# Patient Record
Sex: FEMALE | Race: BLACK OR AFRICAN AMERICAN | URBAN - METROPOLITAN AREA
[De-identification: names, ages, dates, MRNs, and addresses within clinical notes are randomized per-mention and may not be internally consistent; named-entity substitution may affect disease eponyms.]

---
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
## 2022-02-10 ENCOUNTER — OUTPATIENT (OUTPATIENT)
Dept: OUTPATIENT SERVICES | Facility: HOSPITAL | Age: 22
LOS: 1 days | Discharge: HOME | End: 2022-02-10

## 2022-02-10 ENCOUNTER — APPOINTMENT (OUTPATIENT)
Dept: PSYCHIATRY | Facility: CLINIC | Age: 22
End: 2022-02-10

## 2022-02-10 DIAGNOSIS — F31.31 BIPOLAR DISORDER, CURRENT EPISODE DEPRESSED, MILD: ICD-10-CM

## 2022-02-10 PROBLEM — Z00.00 ENCOUNTER FOR PREVENTIVE HEALTH EXAMINATION: Status: ACTIVE | Noted: 2022-02-10

## 2022-03-09 ENCOUNTER — APPOINTMENT (OUTPATIENT)
Dept: PSYCHIATRY | Facility: CLINIC | Age: 22
End: 2022-03-09

## 2024-06-19 ENCOUNTER — EMERGENCY (EMERGENCY)
Facility: HOSPITAL | Age: 24
LOS: 0 days | Discharge: HOME | End: 2024-06-19
Attending: EMERGENCY MEDICINE
Payer: COMMERCIAL

## 2024-06-19 VITALS
HEART RATE: 90 BPM | HEIGHT: 67 IN | OXYGEN SATURATION: 98 % | TEMPERATURE: 99 F | RESPIRATION RATE: 16 BRPM | DIASTOLIC BLOOD PRESSURE: 68 MMHG | WEIGHT: 205.03 LBS | SYSTOLIC BLOOD PRESSURE: 114 MMHG

## 2024-06-19 DIAGNOSIS — R10.9 UNSPECIFIED ABDOMINAL PAIN: ICD-10-CM

## 2024-06-19 DIAGNOSIS — O03.6 DELAYED OR EXCESSIVE HEMORRHAGE FOLLOWING COMPLETE OR UNSPECIFIED SPONTANEOUS ABORTION: ICD-10-CM

## 2024-06-19 DIAGNOSIS — R10.2 PELVIC AND PERINEAL PAIN: ICD-10-CM

## 2024-06-19 DIAGNOSIS — O20.9 HEMORRHAGE IN EARLY PREGNANCY, UNSPECIFIED: ICD-10-CM

## 2024-06-19 DIAGNOSIS — O99.891 OTHER SPECIFIED DISEASES AND CONDITIONS COMPLICATING PREGNANCY: ICD-10-CM

## 2024-06-19 DIAGNOSIS — R35.0 FREQUENCY OF MICTURITION: ICD-10-CM

## 2024-06-19 DIAGNOSIS — R11.0 NAUSEA: ICD-10-CM

## 2024-06-19 DIAGNOSIS — Z88.0 ALLERGY STATUS TO PENICILLIN: ICD-10-CM

## 2024-06-19 LAB
ABO RH CONFIRMATION: SIGNIFICANT CHANGE UP
ALBUMIN SERPL ELPH-MCNC: 4.1 G/DL — SIGNIFICANT CHANGE UP (ref 3.5–5.2)
ALP SERPL-CCNC: 117 U/L — HIGH (ref 30–115)
ALT FLD-CCNC: 13 U/L — SIGNIFICANT CHANGE UP (ref 0–41)
ANION GAP SERPL CALC-SCNC: 10 MMOL/L — SIGNIFICANT CHANGE UP (ref 7–14)
APPEARANCE UR: CLEAR — SIGNIFICANT CHANGE UP
AST SERPL-CCNC: 19 U/L — SIGNIFICANT CHANGE UP (ref 0–41)
BASOPHILS # BLD AUTO: 0.02 K/UL — SIGNIFICANT CHANGE UP (ref 0–0.2)
BASOPHILS NFR BLD AUTO: 0.4 % — SIGNIFICANT CHANGE UP (ref 0–1)
BILIRUB SERPL-MCNC: 0.3 MG/DL — SIGNIFICANT CHANGE UP (ref 0.2–1.2)
BILIRUB UR-MCNC: NEGATIVE — SIGNIFICANT CHANGE UP
BUN SERPL-MCNC: 8 MG/DL — LOW (ref 10–20)
CALCIUM SERPL-MCNC: 8.8 MG/DL — SIGNIFICANT CHANGE UP (ref 8.4–10.4)
CHLORIDE SERPL-SCNC: 103 MMOL/L — SIGNIFICANT CHANGE UP (ref 98–110)
CO2 SERPL-SCNC: 23 MMOL/L — SIGNIFICANT CHANGE UP (ref 17–32)
COLOR SPEC: YELLOW — SIGNIFICANT CHANGE UP
CREAT SERPL-MCNC: 1.1 MG/DL — SIGNIFICANT CHANGE UP (ref 0.7–1.5)
DIFF PNL FLD: NEGATIVE — SIGNIFICANT CHANGE UP
EGFR: 72 ML/MIN/1.73M2 — SIGNIFICANT CHANGE UP
EOSINOPHIL # BLD AUTO: 0.15 K/UL — SIGNIFICANT CHANGE UP (ref 0–0.7)
EOSINOPHIL NFR BLD AUTO: 2.7 % — SIGNIFICANT CHANGE UP (ref 0–8)
GLUCOSE SERPL-MCNC: 81 MG/DL — SIGNIFICANT CHANGE UP (ref 70–99)
GLUCOSE UR QL: NEGATIVE MG/DL — SIGNIFICANT CHANGE UP
HCG SERPL-ACNC: 193.1 MIU/ML — HIGH
HCT VFR BLD CALC: 37.5 % — SIGNIFICANT CHANGE UP (ref 37–47)
HGB BLD-MCNC: 12 G/DL — SIGNIFICANT CHANGE UP (ref 12–16)
IMM GRANULOCYTES NFR BLD AUTO: 0.2 % — SIGNIFICANT CHANGE UP (ref 0.1–0.3)
KETONES UR-MCNC: NEGATIVE MG/DL — SIGNIFICANT CHANGE UP
LEUKOCYTE ESTERASE UR-ACNC: NEGATIVE — SIGNIFICANT CHANGE UP
LYMPHOCYTES # BLD AUTO: 1.3 K/UL — SIGNIFICANT CHANGE UP (ref 1.2–3.4)
LYMPHOCYTES # BLD AUTO: 23.1 % — SIGNIFICANT CHANGE UP (ref 20.5–51.1)
MCHC RBC-ENTMCNC: 29.4 PG — SIGNIFICANT CHANGE UP (ref 27–31)
MCHC RBC-ENTMCNC: 32 G/DL — SIGNIFICANT CHANGE UP (ref 32–37)
MCV RBC AUTO: 91.9 FL — SIGNIFICANT CHANGE UP (ref 81–99)
MONOCYTES # BLD AUTO: 0.4 K/UL — SIGNIFICANT CHANGE UP (ref 0.1–0.6)
MONOCYTES NFR BLD AUTO: 7.1 % — SIGNIFICANT CHANGE UP (ref 1.7–9.3)
NEUTROPHILS # BLD AUTO: 3.75 K/UL — SIGNIFICANT CHANGE UP (ref 1.4–6.5)
NEUTROPHILS NFR BLD AUTO: 66.5 % — SIGNIFICANT CHANGE UP (ref 42.2–75.2)
NITRITE UR-MCNC: NEGATIVE — SIGNIFICANT CHANGE UP
NRBC # BLD: 0 /100 WBCS — SIGNIFICANT CHANGE UP (ref 0–0)
PH UR: 6.5 — SIGNIFICANT CHANGE UP (ref 5–8)
PLATELET # BLD AUTO: 198 K/UL — SIGNIFICANT CHANGE UP (ref 130–400)
PMV BLD: 12.1 FL — HIGH (ref 7.4–10.4)
POTASSIUM SERPL-MCNC: 4.6 MMOL/L — SIGNIFICANT CHANGE UP (ref 3.5–5)
POTASSIUM SERPL-SCNC: 4.6 MMOL/L — SIGNIFICANT CHANGE UP (ref 3.5–5)
PROT SERPL-MCNC: 6.6 G/DL — SIGNIFICANT CHANGE UP (ref 6–8)
PROT UR-MCNC: NEGATIVE MG/DL — SIGNIFICANT CHANGE UP
RBC # BLD: 4.08 M/UL — LOW (ref 4.2–5.4)
RBC # FLD: 13.6 % — SIGNIFICANT CHANGE UP (ref 11.5–14.5)
SODIUM SERPL-SCNC: 136 MMOL/L — SIGNIFICANT CHANGE UP (ref 135–146)
SP GR SPEC: 1.01 — SIGNIFICANT CHANGE UP (ref 1–1.03)
UROBILINOGEN FLD QL: 0.2 MG/DL — SIGNIFICANT CHANGE UP (ref 0.2–1)
WBC # BLD: 5.63 K/UL — SIGNIFICANT CHANGE UP (ref 4.8–10.8)
WBC # FLD AUTO: 5.63 K/UL — SIGNIFICANT CHANGE UP (ref 4.8–10.8)

## 2024-06-19 PROCEDURE — 99284 EMERGENCY DEPT VISIT MOD MDM: CPT

## 2024-06-19 PROCEDURE — 99284 EMERGENCY DEPT VISIT MOD MDM: CPT | Mod: 25

## 2024-06-19 PROCEDURE — 80053 COMPREHEN METABOLIC PANEL: CPT

## 2024-06-19 PROCEDURE — 96374 THER/PROPH/DIAG INJ IV PUSH: CPT

## 2024-06-19 PROCEDURE — 86900 BLOOD TYPING SEROLOGIC ABO: CPT

## 2024-06-19 PROCEDURE — 86901 BLOOD TYPING SEROLOGIC RH(D): CPT

## 2024-06-19 PROCEDURE — 85025 COMPLETE CBC W/AUTO DIFF WBC: CPT

## 2024-06-19 PROCEDURE — 36415 COLL VENOUS BLD VENIPUNCTURE: CPT

## 2024-06-19 PROCEDURE — 86850 RBC ANTIBODY SCREEN: CPT

## 2024-06-19 PROCEDURE — 76830 TRANSVAGINAL US NON-OB: CPT | Mod: 26

## 2024-06-19 PROCEDURE — 81003 URINALYSIS AUTO W/O SCOPE: CPT

## 2024-06-19 PROCEDURE — 84702 CHORIONIC GONADOTROPIN TEST: CPT

## 2024-06-19 PROCEDURE — 76830 TRANSVAGINAL US NON-OB: CPT

## 2024-06-19 RX ORDER — ACETAMINOPHEN 500 MG
650 TABLET ORAL ONCE
Refills: 0 | Status: COMPLETED | OUTPATIENT
Start: 2024-06-19 | End: 2024-06-19

## 2024-06-19 RX ORDER — KETOROLAC TROMETHAMINE 30 MG/ML
15 SYRINGE (ML) INJECTION ONCE
Refills: 0 | Status: DISCONTINUED | OUTPATIENT
Start: 2024-06-19 | End: 2024-06-19

## 2024-06-19 RX ADMIN — Medication 15 MILLIGRAM(S): at 21:16

## 2024-06-19 RX ADMIN — Medication 650 MILLIGRAM(S): at 20:04

## 2024-06-19 NOTE — ED PROVIDER NOTE - PROVIDER TOKENS
FREE:[LAST:[follow up with your OBGYN],PHONE:[(   )    -],FAX:[(   )    -],FOLLOWUP:[1-3 Days]],FREE:[LAST:[follow up with your primary doctor],PHONE:[(   )    -],FAX:[(   )    -],FOLLOWUP:[1-3 Days]]

## 2024-06-19 NOTE — ED PROVIDER NOTE - PATIENT PORTAL LINK FT
You can access the FollowMyHealth Patient Portal offered by [facility] by registering at the following website: [email or web address]. By joining [employer]s FollowMyHealth portal, you will also be able to view your health information using other applications (apps) compatible with our system.

## 2024-06-19 NOTE — ED PROVIDER NOTE - PHYSICAL EXAMINATION
CONST: Well appearing in NAD  EYES: EOMI, Sclera and conjunctiva clear.   CARD: Normal S1 S2; Normal rate and rhythm  RESP: Equal BS B/L, No wheezes, rhonchi or rales. No distress  GI: Soft, non-distended; TTP LLQ and L suprapubic area  MS: Normal ROM in all extremities. No midline spinal tenderness.  SKIN: Warm, dry, no acute rashes. Good turgor  NEURO: A&Ox3, No focal deficits. Strength 5/5 with no sensory deficits. Steady gait

## 2024-06-19 NOTE — ED PROVIDER NOTE - CARE PROVIDER_API CALL
follow up with your OBGYN,   Phone: (   )    -  Fax: (   )    -  Follow Up Time: 1-3 Days    follow up with your primary doctor,   Phone: (   )    -  Fax: (   )    -  Follow Up Time: 1-3 Days

## 2024-06-19 NOTE — ED PROVIDER NOTE - OBJECTIVE STATEMENT
patient is a 24yo  female no sig PMH coming to ED for vaginal bleeding and abdominal pain. pt reports +preg and had US at OBGYN  and was told she was 4w5d, had repeat US on  and gestational sac was noted, had f/u appt and US was negative with no gestational sac noted. pt bHCG not trending down as expected per OBGYN. has had heavy vaginal bleeding x1 week, using 5-6 pads. saw OBGYN today and was told to come to ED for further eval. has associated nausea and increased urinary frequency. denies fever, vomiting, diarrhea, burning with urination, rash, chest pain, SOB

## 2024-06-19 NOTE — ED ADULT TRIAGE NOTE - CHIEF COMPLAINT QUOTE
Left side back pain and buttock pain, recently had miscarriage. Pt states they saw a sac then next time did not. Pt reports she was told to come to ER to r/o ectopic pregnancy.

## 2024-06-19 NOTE — ED PROVIDER NOTE - ATTENDING APP SHARED VISIT CONTRIBUTION OF CARE
23yF  p/w pos preg and now pelvic pain and vaginal bleeding.  Pt seen at her gyn over the past week or so, with GS but no YS, now 2d of heavy bleeding (improved today) and ongoing intermittently severe pain.

## 2024-06-19 NOTE — ED PROVIDER NOTE - CLINICAL SUMMARY MEDICAL DECISION MAKING FREE TEXT BOX
23yF otherwise healthy  w/ +upreg but no IUP on US p/w intermittent pelvic pain and resolved heavy vaginal bleeding.  Pt well appearing and abd soft/benign.  Labs w/ mildly elevated HCG, Rh+ and preserved Hgb.  US w/o IUP or concern for ectopic.  Pain improved and pt remains stable and feeling well.  Pt has her own ob/gyn to follow up - no indication for consulting gyn in the ED and suspect that her sx are from resolving miscarriage.  Recommend urgent o/p f/u with likely serial HCG/US to ensure HCG returns to 0 and pain resolved and careful return precautions.

## 2024-06-20 ENCOUNTER — EMERGENCY (EMERGENCY)
Facility: HOSPITAL | Age: 24
LOS: 0 days | Discharge: HOME | End: 2024-06-21
Attending: EMERGENCY MEDICINE
Payer: COMMERCIAL

## 2024-06-20 VITALS
HEIGHT: 67 IN | RESPIRATION RATE: 17 BRPM | SYSTOLIC BLOOD PRESSURE: 106 MMHG | HEART RATE: 87 BPM | WEIGHT: 207.01 LBS | DIASTOLIC BLOOD PRESSURE: 73 MMHG | TEMPERATURE: 100 F | OXYGEN SATURATION: 100 %

## 2024-06-20 DIAGNOSIS — O00.90 UNSPECIFIED ECTOPIC PREGNANCY WITHOUT INTRAUTERINE PREGNANCY: ICD-10-CM

## 2024-06-20 DIAGNOSIS — O26.899 OTHER SPECIFIED PREGNANCY RELATED CONDITIONS, UNSPECIFIED TRIMESTER: ICD-10-CM

## 2024-06-20 DIAGNOSIS — Z88.0 ALLERGY STATUS TO PENICILLIN: ICD-10-CM

## 2024-06-20 DIAGNOSIS — R10.9 UNSPECIFIED ABDOMINAL PAIN: ICD-10-CM

## 2024-06-20 LAB
ALBUMIN SERPL ELPH-MCNC: 4 G/DL — SIGNIFICANT CHANGE UP (ref 3.5–5.2)
ALP SERPL-CCNC: 110 U/L — SIGNIFICANT CHANGE UP (ref 30–115)
ALT FLD-CCNC: 14 U/L — SIGNIFICANT CHANGE UP (ref 0–41)
ANION GAP SERPL CALC-SCNC: 14 MMOL/L — SIGNIFICANT CHANGE UP (ref 7–14)
APPEARANCE UR: CLEAR — SIGNIFICANT CHANGE UP
APTT BLD: 31.5 SEC — SIGNIFICANT CHANGE UP (ref 27–39.2)
AST SERPL-CCNC: 20 U/L — SIGNIFICANT CHANGE UP (ref 0–41)
BASOPHILS # BLD AUTO: 0.03 K/UL — SIGNIFICANT CHANGE UP (ref 0–0.2)
BASOPHILS NFR BLD AUTO: 0.5 % — SIGNIFICANT CHANGE UP (ref 0–1)
BILIRUB SERPL-MCNC: 0.3 MG/DL — SIGNIFICANT CHANGE UP (ref 0.2–1.2)
BILIRUB UR-MCNC: NEGATIVE — SIGNIFICANT CHANGE UP
BUN SERPL-MCNC: 6 MG/DL — LOW (ref 10–20)
CALCIUM SERPL-MCNC: 8.8 MG/DL — SIGNIFICANT CHANGE UP (ref 8.4–10.5)
CHLORIDE SERPL-SCNC: 103 MMOL/L — SIGNIFICANT CHANGE UP (ref 98–110)
CO2 SERPL-SCNC: 22 MMOL/L — SIGNIFICANT CHANGE UP (ref 17–32)
COLOR SPEC: YELLOW — SIGNIFICANT CHANGE UP
CREAT SERPL-MCNC: 0.8 MG/DL — SIGNIFICANT CHANGE UP (ref 0.7–1.5)
DIFF PNL FLD: NEGATIVE — SIGNIFICANT CHANGE UP
EGFR: 106 ML/MIN/1.73M2 — SIGNIFICANT CHANGE UP
EOSINOPHIL # BLD AUTO: 0.19 K/UL — SIGNIFICANT CHANGE UP (ref 0–0.7)
EOSINOPHIL NFR BLD AUTO: 3 % — SIGNIFICANT CHANGE UP (ref 0–8)
GLUCOSE SERPL-MCNC: 70 MG/DL — SIGNIFICANT CHANGE UP (ref 70–99)
GLUCOSE UR QL: NEGATIVE MG/DL — SIGNIFICANT CHANGE UP
HCG SERPL-ACNC: 206.6 MIU/ML — HIGH
HCT VFR BLD CALC: 37.3 % — SIGNIFICANT CHANGE UP (ref 37–47)
HGB BLD-MCNC: 12.3 G/DL — SIGNIFICANT CHANGE UP (ref 12–16)
IMM GRANULOCYTES NFR BLD AUTO: 0.2 % — SIGNIFICANT CHANGE UP (ref 0.1–0.3)
INR BLD: 1.08 RATIO — SIGNIFICANT CHANGE UP (ref 0.65–1.3)
KETONES UR-MCNC: NEGATIVE MG/DL — SIGNIFICANT CHANGE UP
LEUKOCYTE ESTERASE UR-ACNC: NEGATIVE — SIGNIFICANT CHANGE UP
LIDOCAIN IGE QN: 23 U/L — SIGNIFICANT CHANGE UP (ref 7–60)
LYMPHOCYTES # BLD AUTO: 1.62 K/UL — SIGNIFICANT CHANGE UP (ref 1.2–3.4)
LYMPHOCYTES # BLD AUTO: 26 % — SIGNIFICANT CHANGE UP (ref 20.5–51.1)
MCHC RBC-ENTMCNC: 29.7 PG — SIGNIFICANT CHANGE UP (ref 27–31)
MCHC RBC-ENTMCNC: 33 G/DL — SIGNIFICANT CHANGE UP (ref 32–37)
MCV RBC AUTO: 90.1 FL — SIGNIFICANT CHANGE UP (ref 81–99)
MONOCYTES # BLD AUTO: 0.44 K/UL — SIGNIFICANT CHANGE UP (ref 0.1–0.6)
MONOCYTES NFR BLD AUTO: 7.1 % — SIGNIFICANT CHANGE UP (ref 1.7–9.3)
NEUTROPHILS # BLD AUTO: 3.95 K/UL — SIGNIFICANT CHANGE UP (ref 1.4–6.5)
NEUTROPHILS NFR BLD AUTO: 63.2 % — SIGNIFICANT CHANGE UP (ref 42.2–75.2)
NITRITE UR-MCNC: NEGATIVE — SIGNIFICANT CHANGE UP
NRBC # BLD: 0 /100 WBCS — SIGNIFICANT CHANGE UP (ref 0–0)
PH UR: 6.5 — SIGNIFICANT CHANGE UP (ref 5–8)
PLATELET # BLD AUTO: 214 K/UL — SIGNIFICANT CHANGE UP (ref 130–400)
PMV BLD: 12 FL — HIGH (ref 7.4–10.4)
POTASSIUM SERPL-MCNC: 4.2 MMOL/L — SIGNIFICANT CHANGE UP (ref 3.5–5)
POTASSIUM SERPL-SCNC: 4.2 MMOL/L — SIGNIFICANT CHANGE UP (ref 3.5–5)
PROT SERPL-MCNC: 6.7 G/DL — SIGNIFICANT CHANGE UP (ref 6–8)
PROT UR-MCNC: NEGATIVE MG/DL — SIGNIFICANT CHANGE UP
PROTHROM AB SERPL-ACNC: 12.3 SEC — SIGNIFICANT CHANGE UP (ref 9.95–12.87)
RBC # BLD: 4.14 M/UL — LOW (ref 4.2–5.4)
RBC # FLD: 13.7 % — SIGNIFICANT CHANGE UP (ref 11.5–14.5)
SODIUM SERPL-SCNC: 139 MMOL/L — SIGNIFICANT CHANGE UP (ref 135–146)
SP GR SPEC: 1.01 — SIGNIFICANT CHANGE UP (ref 1–1.03)
UROBILINOGEN FLD QL: 0.2 MG/DL — SIGNIFICANT CHANGE UP (ref 0.2–1)
WBC # BLD: 6.24 K/UL — SIGNIFICANT CHANGE UP (ref 4.8–10.8)
WBC # FLD AUTO: 6.24 K/UL — SIGNIFICANT CHANGE UP (ref 4.8–10.8)

## 2024-06-20 PROCEDURE — 99285 EMERGENCY DEPT VISIT HI MDM: CPT

## 2024-06-20 PROCEDURE — 84702 CHORIONIC GONADOTROPIN TEST: CPT

## 2024-06-20 PROCEDURE — 96372 THER/PROPH/DIAG INJ SC/IM: CPT | Mod: XU

## 2024-06-20 PROCEDURE — 81003 URINALYSIS AUTO W/O SCOPE: CPT

## 2024-06-20 PROCEDURE — 85730 THROMBOPLASTIN TIME PARTIAL: CPT

## 2024-06-20 PROCEDURE — 96374 THER/PROPH/DIAG INJ IV PUSH: CPT

## 2024-06-20 PROCEDURE — 86901 BLOOD TYPING SEROLOGIC RH(D): CPT

## 2024-06-20 PROCEDURE — 36415 COLL VENOUS BLD VENIPUNCTURE: CPT

## 2024-06-20 PROCEDURE — 99283 EMERGENCY DEPT VISIT LOW MDM: CPT

## 2024-06-20 PROCEDURE — 99284 EMERGENCY DEPT VISIT MOD MDM: CPT | Mod: 25

## 2024-06-20 PROCEDURE — 86850 RBC ANTIBODY SCREEN: CPT

## 2024-06-20 PROCEDURE — 87491 CHLMYD TRACH DNA AMP PROBE: CPT

## 2024-06-20 PROCEDURE — 76830 TRANSVAGINAL US NON-OB: CPT | Mod: 26

## 2024-06-20 PROCEDURE — 76830 TRANSVAGINAL US NON-OB: CPT

## 2024-06-20 PROCEDURE — 83690 ASSAY OF LIPASE: CPT

## 2024-06-20 PROCEDURE — 87801 DETECT AGNT MULT DNA AMPLI: CPT

## 2024-06-20 PROCEDURE — 87661 TRICHOMONAS VAGINALIS AMPLIF: CPT

## 2024-06-20 PROCEDURE — 87591 N.GONORRHOEAE DNA AMP PROB: CPT

## 2024-06-20 PROCEDURE — 86900 BLOOD TYPING SEROLOGIC ABO: CPT

## 2024-06-20 PROCEDURE — 87798 DETECT AGENT NOS DNA AMP: CPT

## 2024-06-20 PROCEDURE — 85025 COMPLETE CBC W/AUTO DIFF WBC: CPT

## 2024-06-20 PROCEDURE — 85610 PROTHROMBIN TIME: CPT

## 2024-06-20 PROCEDURE — 80053 COMPREHEN METABOLIC PANEL: CPT

## 2024-06-20 RX ORDER — SODIUM CHLORIDE 9 MG/ML
1000 INJECTION INTRAMUSCULAR; INTRAVENOUS; SUBCUTANEOUS ONCE
Refills: 0 | Status: COMPLETED | OUTPATIENT
Start: 2024-06-20 | End: 2024-06-20

## 2024-06-20 RX ORDER — MORPHINE SULFATE 50 MG/1
2 CAPSULE, EXTENDED RELEASE ORAL ONCE
Refills: 0 | Status: DISCONTINUED | OUTPATIENT
Start: 2024-06-20 | End: 2024-06-20

## 2024-06-20 RX ADMIN — MORPHINE SULFATE 2 MILLIGRAM(S): 50 CAPSULE, EXTENDED RELEASE ORAL at 18:43

## 2024-06-20 RX ADMIN — SODIUM CHLORIDE 1000 MILLILITER(S): 9 INJECTION INTRAMUSCULAR; INTRAVENOUS; SUBCUTANEOUS at 18:43

## 2024-06-20 NOTE — ED ADULT TRIAGE NOTE - CHIEF COMPLAINT QUOTE
Pt sent in by OBGYN for r/o ectopic. LMP 4/27. Pt states she was seen in ED and told she was having miscarriage, sent in by MD because her HCG levels aren't going down.

## 2024-06-20 NOTE — ED PROVIDER NOTE - PATIENT PORTAL LINK FT
You can access the FollowMyHealth Patient Portal offered by [facility] by registering at the following website: [email or web address]. By joining [employer]’s FollowMyHealth portal, you will also be able to view your health information using other applications (apps) compatible with our system.

## 2024-06-20 NOTE — ED PROVIDER NOTE - PROVIDER TOKENS
PROVIDER:[TOKEN:[[id number]],FOLLOWUP:[1-3 Days]] PROVIDER:[TOKEN:[[id number]],FOLLOWUP:[1-3 Days]],PROVIDER:[TOKEN:[[id number]],FOLLOWUP:[4-6 Days]]

## 2024-06-20 NOTE — ED PROVIDER NOTE - PHYSICAL EXAMINATION
CONSTITUTIONAL: well-appearing, in NAD  SKIN: Warm dry, normal skin turgor  HEAD: NCAT  EYES: EOMI, PERRLA, no scleral icterus, conjunctiva pink  ENT: normal pharynx with no erythema or exudates  NECK: Supple; non tender. Full ROM.  CARD: RRR  RESP: clear to ausculation b/l. No crackles or wheezing.  ABD: soft, LLQ TTP, non-distended, no rebound or guarding.  EXT: Full ROM, no bony tenderness, no pedal edema, no calf tenderness  NEURO: normal motor. normal sensory. Normal gait.  PSYCH: Cooperative, appropriate.

## 2024-06-20 NOTE — ED PROVIDER NOTE - ATTENDING CONTRIBUTION TO CARE
What Type Of Note Output Would You Prefer (Optional)?: Standard Output
Has Your Skin Lesion Been Treated?: not been treated
Is This A New Presentation, Or A Follow-Up?: Growths
I have personally performed a history and physical exam on this patient and personally directed the management of the patient.

## 2024-06-20 NOTE — ED ADULT NURSE NOTE - NSFALLUNIVINTERV_ED_ALL_ED
Bed/Stretcher in lowest position, wheels locked, appropriate side rails in place/Call bell, personal items and telephone in reach/Instruct patient to call for assistance before getting out of bed/chair/stretcher/Non-slip footwear applied when patient is off stretcher/[place] to call system/Physically safe environment - no spills, clutter or unnecessary equipment/Purposeful proactive rounding/Room/bathroom lighting operational, light cord in reach

## 2024-06-20 NOTE — ED PROVIDER NOTE - NSFOLLOWUPINSTRUCTIONS_ED_ALL_ED_FT
FOLLOW UP WITH YOUR DOCTOR THIS WEEK FOR REEVALUATION AND REPEAT BLOOD WORK    Patient instructed on need for follow up of b-hcg on day 4 (6/24) and day 7 (6/27) of methotrexate therapy.       RETURN TO ED IMMEDIATELY WITH ANY WORSENING SYMPTOMS, CHEST PAIN, SHORTNESS OF BREATH, ABDOMINAL PAIN, FEVERS, WEAKNESS, DIZZINESS, PERSISTENT OR SEVERE HEADACHE OR ANY OTHER CONCERNS.

## 2024-06-20 NOTE — ED PROVIDER NOTE - PROGRESS NOTE DETAILS
AE: Patient evaluated by OB/GYN.  Patient given methotrexate.  No reaction.  Patient feels well.  Patient understands the importance of follow-up.  Will discharge with strict return precautions.

## 2024-06-20 NOTE — ED PROVIDER NOTE - CARE PROVIDERS DIRECT ADDRESSES
,[email or web address]

## 2024-06-20 NOTE — ED PROVIDER NOTE - CLINICAL SUMMARY MEDICAL DECISION MAKING FREE TEXT BOX
Patient evaluated for abnormal pregnancy with slightly increasing hCG from 93-2 06.  Ultrasound repeated and shows possible ectopic pregnancy.  Patient evaluated by GYN with recommendations to give IM methotrexate in ED and with strict follow-up instructions to see GYN and to have repeat blood work on 6/24 and 6/27 which patient was instructed to do and agreed.  Recommended close follow-up with her GYN as well.  Strict return precautions advised.

## 2024-06-20 NOTE — ED PROVIDER NOTE - CARE PROVIDER_API CALL
Patient/Caregiver provided printed discharge information. Marisa Skelton  Obstetrics and Gynecology  [address] [place] [address]  Phone: [phone]  Fax: [phone]  Follow Up Time: 1-3 Days   Marisa Skelton  Obstetrics and Gynecology  [address] [place] [address]  Phone: [phone]  Fax: [phone]  Follow Up Time: 1-3 Days    [place] [address]  Phone: [phone]  Fax: [phone]  Follow Up Time: 4-6 Days

## 2024-06-20 NOTE — ED PROVIDER NOTE - OBJECTIVE STATEMENT
Patient is a 23-year-old male past medical history of treated STDs presenting to the ED with pregnancy problems.  Patient states on 5/30 she found out she was pregnant.  Patient was following up with MARISA Russell.  On 6/11 patient started to have bleeding and was told she was having a miscarriage.  Patient was seen in the ED yesterday for an ectopic rule out with negative lab results.  Patient saw her PA OB/GYN today and was told to come back into the emergency room to further rule out ectopic pregnancy.  Patient states beta-hCG is not downtrending and this is why they are concerned.  Patient is experiencing left lower quadrant pain.  Patient states bleeding has mostly resolved and she now only has some occasional spotting.    No nausea, vomiting, fevers, chills, shortness of breath, chest pain Patient is a 23-year-old  female past medical history of treated STDs presenting to the ED with pregnancy problems.  Patient states on 5/30 she found out she was pregnant.  Patient was following up with MARISA Russell.  On 6/11 patient started to have bleeding and was told she was having a miscarriage.  Patient was seen in the ED yesterday for an ectopic rule out with negative lab results.  Patient saw her PA OB/GYN today and was told to come back into the emergency room to further rule out ectopic pregnancy.  Patient states beta-hCG is not downtrending and this is why they are concerned.  Patient is experiencing left lower quadrant pain.  Patient states bleeding has mostly resolved and she now only has some occasional spotting.    No nausea, vomiting, fevers, chills, shortness of breath, chest pain

## 2024-06-21 VITALS
OXYGEN SATURATION: 100 % | SYSTOLIC BLOOD PRESSURE: 114 MMHG | TEMPERATURE: 98 F | HEART RATE: 68 BPM | RESPIRATION RATE: 18 BRPM | DIASTOLIC BLOOD PRESSURE: 69 MMHG

## 2024-06-21 RX ORDER — METHOTREXATE 2.5 MG/1
102 TABLET ORAL ONCE
Refills: 0 | Status: COMPLETED | OUTPATIENT
Start: 2024-06-21 | End: 2024-06-21

## 2024-06-21 RX ADMIN — METHOTREXATE 102 MILLIGRAM(S): 2.5 TABLET ORAL at 01:11

## 2024-06-21 NOTE — CONSULT NOTE ADULT - SUBJECTIVE AND OBJECTIVE BOX
PGY2    Chief Complaint: r/o ectopic    HPI: 24 y/o , LMP  sent in by primary GYN Sonya Gloria for r/o ectopic pregnancy. Initially found out she was pregnant when she went to [facility]  for STD testing. GCCT negative at that time. Seen by her primary GYN, outpatient bhcg as below. Had heavy vaginal bleeding approx 1.5 weeks ago, was told she was likely miscarrying, reports passage of tissue. Bleeding since resolved. Intermittent LLQ pain, mildly alleviated with tylenol/ibuprofen. Seen in ED  for LLQ pain, TVUS negative for IUP or ectopic. Returned today after called by GYN for abnormal bhcg. Reports unprotected intercourse twice within past two weeks. Denies fevers, chills, chest pain, SOB, dysuria, diarrhea. H/o childhood asthma, no H/I.     Outpatient bhcg trend as follows:    112   260 - TVUS at this time, no IUP visualized was told around 4w5d pregnant, no adnexal masses  6/10 202   211     Ob/Gyn History:               LMP -    Denies history of ovarian cysts, uterine fibroids. H/o chlamydia and gonorrhea, NEW recently negative. Denies h/o other STIs.     Denies the following: constitutional symptoms, visual symptoms, cardiovascular symptoms, respiratory symptoms, GI symptoms, musculoskeletal symptoms, skin symptoms, neurologic symptoms, hematologic symptoms, allergic symptoms, psychiatric symptoms  Except any pertinent positives listed.     Home Medications: None    Allergies: penicillin (hives)     PAST MEDICAL & SURGICAL HISTORY:  No pertinent past medical history    SOCIAL HISTORY: Denies cigarette use or illicit drug use. Social alcohol use.     Vital Signs Last 24 Hrs  T(F): 98.4 (2024 00:13), Max: 99.5 (2024 16:28)  HR: 68 (2024 00:13) (63 - 87)  BP: 114/69 (2024 00:13) (106/73 - 114/69)  RR: 18 (2024 00:13) (17 - 18)    General Appearance - AAOx3, NAD  Abdomen - Soft, nontender, nondistended, no rebound, no rigidity, no guarding    GYN/Pelvis:   External genitalia: Normal  Vagina: Moderate white discharge, no bleeding  Cervix: No active bleeding  Uterus: nontender  Adnexa: nontender    LABS:                        12.3   6.  )-----------( 214      ( 2024 18:42 )             37.3     HCG Quantitative, Serum: 206.6 mIU/mL (24 @ 18:42)  HCG Quantitative, Serum: 193.1 mIU/mL (24 @ 18:50)    ABO RH Interpretation: O POS (24 @ 18:42)  Antibody Screen: NEG (24 @ 18:42)        139  |  103  |  6<L>  ----------------------------<  70  4.2   |  22  |  0.8    Ca    8.8      2024 18:42    TPro  6.7  /  Alb  4.0  /  TBili  0.3  /  DBili  x   /  AST  20  /  ALT  14  /  AlkPhos  110      PT/INR - ( 2024 18:42 )   PT: 12.30 sec;   INR: 1.08 ratio         PTT - ( 2024 18:42 )  PTT:31.5 sec  Urinalysis Basic - ( 2024 18:42 )    Color: Yellow / Appearance: Clear / S.013 / pH: x  Gluc: 70 mg/dL / Ketone: Negative mg/dL  / Bili: Negative / Urobili: 0.2 mg/dL   Blood: x / Protein: Negative mg/dL / Nitrite: Negative   Leuk Esterase: Negative / RBC: x / WBC x   Sq Epi: x / Non Sq Epi: x / Bacteria: x    RADIOLOGY & ADDITIONAL STUDIES:  r< from: US Transvaginal (24 @ 21:38) >    ACC: [id number] EXAM:  US TRANSVAGINAL   ORDERED BY: GILDARDO HANNAH     PROCEDURE DATE:  2024          INTERPRETATION:  CLINICAL INFORMATION: Rising beta hCG, left pelvic pain,   Ectopic pregnancy evaluation    LMP: 2024    COMPARISON: Pelvic sonogram 2024.    TECHNIQUE:  Endovaginal and transabdominal pelvic sonogram. Color and Spectral   Doppler was performed.    FINDINGS:  Uterus: 8.4 cm x 4.3 cm x 5.7 cm. No intrauterine gestational sac   visualized.  Endometrium: 5 mm. Within normal limits.    Right ovary: 4.4 cm x 2.1 cm x 2.8 cm, volume 14 mL. 2.1 x 1.5 x 1.4 cm   isoechoic structure within the right ovary, likely representing a corpus   luteal cyst. 2.5 cm dominant follicle. Vascular flow demonstrated to the   right ovary.  Left ovary: 2.7 cm x 1.1 cm x 1.8 cm, volume 3 mL. Within normal limits.   Vascular flow demonstrated to the left ovary.    Within the left adnexa, adjacent to and separate from the left ovary,   there is a structure of heterogeneous echogenicity demonstrating   peripheral flow and measuring approximately 2.7 cm x 1.5 cm x 2.8 cm. No   definite gestational sac visualized.    Fluid: Presence of pelvic free fluid.    IMPRESSION:    Within the left adnexa, adjacent to and separate from the left ovary,   there is a structure of heterogeneous echogenicity demonstrating   peripheral flow and measuring approximately 2.7 cm x 1.5 cm x 2.8 cm. No   definite gestational sac visualized. However in the context of rising   beta hCG and no intrauterine gestational sac, this structure may   represent an ectopic pregnancy.        --- End of Report ---    -----------------------------------------      ELIE PERALTA MD; Resident Radiologist  This document has been electronically signed.  GARY LEONARD MD; Attending Radiologist  This document has been electronically signed. 2024 11:16PM    < end of copied text >  < from: US Transvaginal (24 @ 19:28) >    ACC: [id number] EXAM:  US TRANSVAGINAL   ORDERED BY: FLOR CORCORAN     PROCEDURE DATE:  2024      INTERPRETATION:  CLINICAL INFORMATION: Assess for ectopic pregnancy.    LMP: 2024    COMPARISON: Pelvic sonogram 2014.    TECHNIQUE:Endovaginal and transabdominal pelvic sonogram. Color and   Spectral Doppler was performed.    FINDINGS:    Uterus: 8.1 cm x 4.6 cm x 5.7 cm. No intrauterine pregnancy.  Endometrium: 4 mm. Within normal limits.    Right ovary: 3.9 cm x 2.3 cm x 2.6 cm. 1.8 x 1.8 cm cyst. Doppler flow is   noted in the right ovary.  Left ovary: 2.5 cm x 1.4 cm x 1.8 cm. within normal limits. Doppler flow   is noted to the left ovary.    Fluid: None.    IMPRESSION:    No visualized intrauterine or ectopic pregnancy.    --- End of Report ---          EMERALD SCHULTE MD; Resident Radiologist  This document has been electronically signed.  CELIA HYDE MD; Attending Radiologist  This document has been electronically signed. 2024  8:26PM    < end of copied text >   PGY2    Chief Complaint: r/o ectopic    HPI: 22 y/o , LMP  sent in by primary GYN Sonya Gloria for r/o ectopic pregnancy. Initially found out she was pregnant when she went to [facility]  for STD testing. GCCT negative at that time. Seen by her primary GYN, outpatient bhcg as below. Had heavy vaginal bleeding approx 1.5 weeks ago, was told she was likely miscarrying, reports passage of tissue. Bleeding since resolved. Intermittent LLQ pain, mildly alleviated with tylenol/ibuprofen. Seen in ED  for LLQ pain, TVUS negative for IUP or ectopic. Returned today after called by GYN for abnormal bhcg. Reports unprotected intercourse twice within past two weeks. Denies fevers, chills, chest pain, SOB, dysuria, diarrhea. H/o childhood asthma, no H/I.     Outpatient bhcg trend as follows:    112   260 - TVUS at this time, no IUP visualized was told around 4w5d pregnant, no adnexal masses  6/10 202   211     Ob/Gyn History:  , 1x FT,  (2018), 3x sAb (pt states had a D&C with each), 1x eTOP w/ D&C         LMP -    Denies history of ovarian cysts, uterine fibroids. H/o chlamydia and gonorrhea, NEW recently negative. Denies h/o other STIs.     Denies the following: constitutional symptoms, visual symptoms, cardiovascular symptoms, respiratory symptoms, GI symptoms, musculoskeletal symptoms, skin symptoms, neurologic symptoms, hematologic symptoms, allergic symptoms, psychiatric symptoms  Except any pertinent positives listed.     Home Medications: None    Allergies: penicillin (hives)     PAST MEDICAL & SURGICAL HISTORY:  No pertinent past medical history    SOCIAL HISTORY: Denies cigarette use or illicit drug use. Social alcohol use.     Vital Signs Last 24 Hrs  T(F): 98.4 (2024 00:13), Max: 99.5 (2024 16:28)  HR: 68 (2024 00:13) (63 - 87)  BP: 114/69 (2024 00:13) (106/73 - 114/69)  RR: 18 (2024 00:13) (17 - 18)    General Appearance - AAOx3, NAD  Abdomen - Soft, nontender, nondistended, no rebound, no rigidity, no guarding    GYN/Pelvis:   External genitalia: Normal  Vagina: Moderate white discharge, no bleeding  Cervix: No active bleeding  Uterus: nontender  Adnexa: nontender    LABS:                        12.3     )-----------( 214      ( 2024 18:42 )             37.3     HCG Quantitative, Serum: 206.6 mIU/mL (24 @ 18:42)  HCG Quantitative, Serum: 193.1 mIU/mL (24 @ 18:50)    ABO RH Interpretation: O POS (24 @ 18:42)  Antibody Screen: NEG (24 @ 18:42)        139  |  103  |  6<L>  ----------------------------<  70  4.2   |  22  |  0.8    Ca    8.8      2024 18:42    TPro  6.7  /  Alb  4.0  /  TBili  0.3  /  DBili  x   /  AST  20  /  ALT  14  /  AlkPhos  110  06-20    PT/INR - ( 2024 18:42 )   PT: 12.30 sec;   INR: 1.08 ratio         PTT - ( 2024 18:42 )  PTT:31.5 sec  Urinalysis Basic - ( 2024 18:42 )    Color: Yellow / Appearance: Clear / S.013 / pH: x  Gluc: 70 mg/dL / Ketone: Negative mg/dL  / Bili: Negative / Urobili: 0.2 mg/dL   Blood: x / Protein: Negative mg/dL / Nitrite: Negative   Leuk Esterase: Negative / RBC: x / WBC x   Sq Epi: x / Non Sq Epi: x / Bacteria: x    RADIOLOGY & ADDITIONAL STUDIES:  r< from: US Transvaginal (24 @ 21:38) >    ACC: [id number] EXAM:  US TRANSVAGINAL   ORDERED BY: GILDARDO HANNAH     PROCEDURE DATE:  2024          INTERPRETATION:  CLINICAL INFORMATION: Rising beta hCG, left pelvic pain,   Ectopic pregnancy evaluation    LMP: 2024    COMPARISON: Pelvic sonogram 2024.    TECHNIQUE:  Endovaginal and transabdominal pelvic sonogram. Color and Spectral   Doppler was performed.    FINDINGS:  Uterus: 8.4 cm x 4.3 cm x 5.7 cm. No intrauterine gestational sac   visualized.  Endometrium: 5 mm. Within normal limits.    Right ovary: 4.4 cm x 2.1 cm x 2.8 cm, volume 14 mL. 2.1 x 1.5 x 1.4 cm   isoechoic structure within the right ovary, likely representing a corpus   luteal cyst. 2.5 cm dominant follicle. Vascular flow demonstrated to the   right ovary.  Left ovary: 2.7 cm x 1.1 cm x 1.8 cm, volume 3 mL. Within normal limits.   Vascular flow demonstrated to the left ovary.    Within the left adnexa, adjacent to and separate from the left ovary,   there is a structure of heterogeneous echogenicity demonstrating   peripheral flow and measuring approximately 2.7 cm x 1.5 cm x 2.8 cm. No   definite gestational sac visualized.    Fluid: Presence of pelvic free fluid.    IMPRESSION:    Within the left adnexa, adjacent to and separate from the left ovary,   there is a structure of heterogeneous echogenicity demonstrating   peripheral flow and measuring approximately 2.7 cm x 1.5 cm x 2.8 cm. No   definite gestational sac visualized. However in the context of rising   beta hCG and no intrauterine gestational sac, this structure may   represent an ectopic pregnancy.        --- End of Report ---    -----------------------------------------      ELIE PERALTA MD; Resident Radiologist  This document has been electronically signed.  GARY LEONARD MD; Attending Radiologist  This document has been electronically signed. 2024 11:16PM    < end of copied text >  < from: US Transvaginal (24 @ 19:28) >    ACC: [id number] EXAM:  US TRANSVAGINAL   ORDERED BY: FLOR CORCORAN     PROCEDURE DATE:  2024      INTERPRETATION:  CLINICAL INFORMATION: Assess for ectopic pregnancy.    LMP: 2024    COMPARISON: Pelvic sonogram 2014.    TECHNIQUE:Endovaginal and transabdominal pelvic sonogram. Color and   Spectral Doppler was performed.    FINDINGS:    Uterus: 8.1 cm x 4.6 cm x 5.7 cm. No intrauterine pregnancy.  Endometrium: 4 mm. Within normal limits.    Right ovary: 3.9 cm x 2.3 cm x 2.6 cm. 1.8 x 1.8 cm cyst. Doppler flow is   noted in the right ovary.  Left ovary: 2.5 cm x 1.4 cm x 1.8 cm. within normal limits. Doppler flow   is noted to the left ovary.    Fluid: None.    IMPRESSION:    No visualized intrauterine or ectopic pregnancy.    --- End of Report ---          EMERALD SCHULTE MD; Resident Radiologist  This document has been electronically signed.  CELIA HYDE MD; Attending Radiologist  This document has been electronically signed. 2024  8:26PM    < end of copied text >

## 2024-06-21 NOTE — CONSULT NOTE ADULT - ASSESSMENT
22 y/o , LMP , with abnormal bhcg, left adnexal structure suspicious for ectopic pregnancy with no IUP on TVUS, currently clinically and hemodynamically stable.     - TVUS findings reviewed with patient  - CBC, CMP WNL  - Rh pos  - Recommend  Risks of MTX treatment discussed with patient. Voiced and understanding and signed MTX consent.  -We discussed common side effects of the medication, including GI upset, as well as restrictions while on the medication, including avoidance of vitamins, folic acid, NSAIDS, alcohol and high iron/folate foods and supplements.    -Patient signed methotrexate information sheet and the Methotrexate consent form (subsequently filed in the patient's chart).  -Reviewed with patient the methotrexate failure rate and possibility of necessity for additional dose of methotrexate or surgical intervention.   -Methotrexate dose calculated based on BSA for patient. Methotrexate dose: MTX 102mg IM to be administered for suspected ectopic pregnancy.  -Patient given strict precautions to call her physician or return to ED if she experiences any severe abdominal pain, dizziness, lightheadedness, severe n/v, or any heavy vaginal bleeding >2 pads/hour for >2 hours.    -Patient instructed on need for follow up of b-hcg on day 4 and day 7 of methotrexate therapy.  Patient intends to follow up for lab levels on  and . GYN to follow results.   -Discussed importance for avoiding pregnancy until bhcg levels reach 0.   -Dispo per ED    Discussed with Dr. Skelton.

## 2024-06-21 NOTE — CONSULT NOTE ADULT - ATTENDING COMMENTS
Patient with sonographic and lab findings concerning for ectopic pregnancy. Options for management discussed with patient and patient opted for medical management with methotrexate. Patient aware of R/B/A discussed today and all questions answered. Patient aware to follow up for serial Hcgs as states above. Ectopic, pain and bleeding precautions given.

## 2024-06-23 LAB
A VAGINAE DNA VAG QL NAA+PROBE: SIGNIFICANT CHANGE UP
BVAB2 DNA VAG QL NAA+PROBE: SIGNIFICANT CHANGE UP
C ALBICANS DNA VAG QL NAA+PROBE: NEGATIVE — SIGNIFICANT CHANGE UP
C GLABRATA DNA VAG QL NAA+PROBE: NEGATIVE — SIGNIFICANT CHANGE UP
C KRUSEI DNA VAG QL NAA+PROBE: NEGATIVE — SIGNIFICANT CHANGE UP
C LUSITANIAE DNA VAG QL NAA+PROBE: NEGATIVE — SIGNIFICANT CHANGE UP
C TRACH RRNA SPEC QL NAA+PROBE: NEGATIVE — SIGNIFICANT CHANGE UP
CANDIDA DNA VAG QL NAA+PROBE: NEGATIVE — SIGNIFICANT CHANGE UP
MEGA1 DNA VAG QL NAA+PROBE: SIGNIFICANT CHANGE UP
N GONORRHOEA RRNA SPEC QL NAA+PROBE: NEGATIVE — SIGNIFICANT CHANGE UP
T VAGINALIS RRNA SPEC QL NAA+PROBE: NEGATIVE — SIGNIFICANT CHANGE UP

## 2024-06-24 NOTE — CHART NOTE - NSCHARTNOTEFT_GEN_A_CORE
Patient called to assess well-being and remind to get repeat HCG. Pt endorses continued intermittent abdominal pain, not worsened. Denies vaginal bleeding, lightheadedness, or nausea/vomiting. Aware to go for blood work today. Ectopic and bleeding precautions discussed. PGY2 BETA NOTE  Patient called to assess well-being and remind to get repeat HCG. Pt endorses continued intermittent abdominal pain, not worsened. Denies vaginal bleeding, lightheadedness, or nausea/vomiting. Aware to go for blood work today. Ectopic and bleeding precautions discussed.

## 2024-06-25 NOTE — CHART NOTE - NSCHARTNOTEFT_GEN_A_CORE
PGY 2 BETA NOTE  S/p MTX on 06/21. Pt called to inform of Day 4 HCG results and reminded to repeat HCG on 06/27 (day 7). Pt states she is doing well, endorses irregular mild abdominal cramps, that has improved from previous. States vaginal bleeding has resolved. Ectopic and bleeding precautions discussed.

## 2024-06-27 NOTE — CHART NOTE - NSCHARTNOTEFT_GEN_A_CORE
Attempted to call patient to get repeat HCG today and assess well-being. Patient did not answer. VM x1 left with callback number.

## 2024-06-28 NOTE — CHART NOTE - NSCHARTNOTEFT_GEN_A_CORE
PGY2 NOTE  Attempted to call patient to get repeat HCG today and assess well-being. Patient did not answer. VM x1 left at pt's number and EC with callback number.

## 2024-06-29 DIAGNOSIS — O00.90 UNSPECIFIED. ECTOPIC. PREGNANCY WITHOUT INTRAUTERINE PREGNANCY: ICD-10-CM

## 2024-06-29 LAB
HCG SERPL-MCNC: 103.3 MIU/ML
HCG SERPL-MCNC: 179 MIU/ML

## 2024-06-29 NOTE — CHART NOTE - NSCHARTNOTEFT_GEN_A_CORE
PGY 2 Note  Attempted to call patient to discussed appropriately decrease HCG levels and assess well-being. Patient did not answer, VM x1 left with call back number. Next repeat HCG scheduled for 07/04 or 07/05 given holiday.

## 2024-06-29 NOTE — CHART NOTE - NSCHARTNOTEFT_GEN_A_CORE
PGY2 Note  Patient called to discuss appropriately decreasing HCG levels. Pt states she's doing well, no complaints and asymptomatic. Next repeat HCG scheduled for 07/05, patient aware.

## 2024-07-10 ENCOUNTER — OUTPATIENT (OUTPATIENT)
Dept: OUTPATIENT SERVICES | Facility: HOSPITAL | Age: 24
LOS: 1 days | End: 2024-07-10
Payer: COMMERCIAL

## 2024-07-10 DIAGNOSIS — Z00.00 ENCOUNTER FOR GENERAL ADULT MEDICAL EXAMINATION WITHOUT ABNORMAL FINDINGS: ICD-10-CM

## 2024-07-10 PROCEDURE — 84702 CHORIONIC GONADOTROPIN TEST: CPT

## 2024-07-11 DIAGNOSIS — Z00.00 ENCOUNTER FOR GENERAL ADULT MEDICAL EXAMINATION WITHOUT ABNORMAL FINDINGS: ICD-10-CM

## 2024-08-05 NOTE — ED PROVIDER NOTE - DISPOSITION TYPE
August 5, 2024       Chidi Mcknight MD  [address] [address]  Via Fax: [phone]      Patient: Aleksander Briones   YOB: 1960   Date of Visit: 8/5/2024       Dear Dr. Mcknight:    I saw your patient, Aleksander Briones, for an evaluation. Below are my notes for this visit with him.    If you have questions, please do not hesitate to call me.      Sincerely,        Naz Bob MD        CC: No Recipients    Naz Bob MD  8/5/2024  4:16 PM  Sign when Signing Visit  8/5/2024 rheumatology follow-up    Initial consultation 1/23/2021: Continue prednisone 50 mg daily, hepatitis and TB screening, consider CellCept versus methotrexate for rheumatoid arthritis.  Follow-up 2/23/2021: Increase MMF to 1000 mg twice daily, continue prednisone 10 mg daily, repeat CT scan March 5, 2021, repeat pulmonary function test, follow-up with   Follow-up 4/9/2021: Increase CellCept to 1500 mg twice daily, continue prednisone 10 mg daily, reviewed CT scan, no progression  Follow-up 07/14/2021: Continue CellCept 1500 mg twice daily, decrease prednisone to 7.5 mg daily, start  mg daily for arthritis, baseline eye exam, will need a repeat PFT and 6 min walk ( will see Dr. Curiel after that )   Follow-up 9/30/2021: Continue CellCept 1500 mg twice daily, continue hydroxychloroquine 400 mg daily, improvement in the PFT, no need for repeat CT scan  Follow-up 1/12/2022: Continue CellCept 1500 mg twice daily, continue  mg daily, follow-up PFTs, no decline in the respiratory function  Follow-up 3/30/2022: Continue CellCept 1500 mg twice daily, continue  mg daily.   Follow up 8/3/2022: Continue CellCept 1500 mg twice daily, continue to 400 mg daily, will repeat pulmonary function test between visits, no need for repeat CT scan.   ==HRCR 12/23/2022: Minimal progression of the left basilar honeycombing. Otherwise stable UIP  ==PFT 12/14/2022: FVC 72% of predicted, DLCO 51%    ==PFT 12/29/22: no decline compared to 9/22, decline of 11% compared to 3/22  Follow up 02/04/2022: Continue Cellcept 1500 mg bid, Continue  mg QD, repeat PFT in 3 months, if there is a decline will stop MMF and switch to either Rituxan or Abatacept   ==PFT 3/15/2023: FVC 68% of predicted, DLCO 53%   Follow up 06/12/2023: Continue CellCept 1500 mg twice daily, continue  mg daily, continue prednisone 5 mg daily, repeat labs in July, follow-up in 3 months  Follow up 8/28/2023: Continue CellCept 1500 mg twice daily, continue  mg daily, continue prednisone 5 mg daily, switched from Esbriet to Ofev   ==9/1/2023: had a fall at work and broke his R distal forearm   ==11/3/2023: DEXA shows osteoporosis   Follow up 12/26/2023: Continue CellCept 1500 mg twice daily, continue  mg daily, continue prednisone 5 mg daily, continue ofev.  Will start alendronate when he is done with the dental work  Follow up 4/30/2024: continue Cellcept 1500 mg bid , continue  mg , continue prednisone 5 mg daily , continue ofev 150 mg bid . Pending dental work still.  Right knee Kenalog injection  Follow-up 8/5/2024: Continue CellCept 1500 mg twice daily, continue  mg daily, continue prednisone 5 mg daily, continue Ofev 150 mg twice daily, reviewed results of the knee x-ray, medial joint space narrowing in the right knee, consider replacement.     Rheumatology problem list:  RA-ILD/ UIP pattern, elevated ESR and CRP  RA with RF of 1320 and Anti-CCP of 194  Long-term use of high-risk medication  Indeterminant TB test but no risk factors  Osteoporosis per DEXA scan 11/3/2023    Previous serologies/Genetics:  RF negative on 11/21/2019  RF 1320 on 1/18/2020  Anti- on 1/18/2021  Hep B,C (-) on 1/22/2021  TB indeterminate     Current rheumatologic medications/Rheumatology related meds:  MMF 1500 mg bid ( started Jan 2021 )    mg daily  Prednisone 5 mg qd   Ofev since Aug 2nd     Last eye exam  07/23/2022: normal baseline     CT chest on 11/19/2019:   1.  There is peripheral honeycombing with architectural distortion and mild bronchiectasis in a pattern consistent with usual interstitial pneumonitis.  2.  Mediastinal lymphadenopathy may be reactive in nature.     CT chest on 12/04/2020:  The thyroid is unremarkable. No enlarged paratracheal nodes.  The heart is enlarged. There is calcific atheromatous disease of the native coronary arteries. There is no pericardial effusion. The mid ascending aorta measures 3.2 cm. The main pulmonary artery measures 3.5 cm. The esophagus is decompressed. Limited   visualization of the upper abdomen organs is unremarkable.   Evaluation of the lung parenchyma patchy areas of subpleural reticulation with associated mild traction and fibrotic changes noted throughout the lung parenchyma.  Findings are relatively symmetric involving the lung parenchyma bilaterally.  Findings are worse involving the posterior costophrenic margins.  There are changes of traction bronchiectasis.  No groundglass.  No consolidation.  No definitive evidence of honeycombing.  The central airways are patent.  These findings are relatively similar to   the prior exam from November, 2019.  No pleural effusion.  No pneumothorax.  Changes of degenerative disc disease is noted.  No aggressive osseous findings.  IMPRESSION:  Findings suggestive of probable UIP pattern.  I personally reviewed the CT scan with him in the room.  Honeycombing is most pertinent on the left lower lobe.    CT chest 3/5/2021:  1.    No significant change in peripheral and basilar predominant  interstitial reticulation with traction bronchiectasis and honeycombing,  consistent with UIP pattern.    2.    CT evidence of pulmonary arterial hypertension    HRCT on 12/23/2022:   1.   Minimal progression of the left basilar honeycombing with otherwise  stable findings consistent with UIP.  2.   Persistent dilatation of the pulmonary  arteries suggestive of  pulmonary arterial hypertension.                PFT 12/29/2022:    Compared to previous pulmonary function test there is no significant decline in the total lung capacity when comparing to the pulmonary function test from September 20, 2022 but when comparing to the pulmonary function test performed on March 2, 2022 there is a decline from 5.46 L to 4.89 L which is considered a significant decline of 11%.     XR knee 4/30/2024:   Three views of each knee reveal no evidence of fracture. There is no knee  joint effusion. There is evidence of diffuse osteopenia of all visualized  bones present. Valgus deformity is identified bilaterally. There is  evidence of moderate to severe medial and patellofemoral compartmental  joint space narrowing identified with marginal tricompartmental spur  formation.  IMPRESSION:  Moderate to severe DJD knee radiographs.          DEXA scan 3/2023:  Lumbar spine T-score -2.7  Left femoral neck -1.7  Right femoral neck -2.0  FRAX score  Major osteoporotic fracture risk 14.9%  Hip fracture risk 3.0%    ESR:   41 on 1/18/2021  38 on 1/12/2022  19 on 3/30/2022  35 on 11/14/22  13 on 4/27/2023    CRP:   10.2 on 1/18/2021  4.4 on 1/22/2021  4.0 on 3/19/2021  1.2 on 4/8/2021  12.6 on 7/19/2021  0.9 on 1/12/2022  7.8 on 3/30/2022  2.3 on 11/14/22  1.0 on 4/27/2023  ____________________________________________________________________    CC: \" This is a follow-up for my lung issue and rheumatoid arthritis \"    HPI:   Background:  RA-ILD, +RF, +AntiCCP:   His primary care doctor  noticed clubbing on a routine physical in 2019  This was followed by a CXR that showed possible ILD  A CT scan was done on November 19, 2019 which showed peripheral honeycombing with architectural distortion of the mild bronchiectasis in a pattern consistent with UIP.  He was later seen by  who did a preliminary work-up for autoimmune pathology back in November 21, 2019.  At that  point RF was negative, JODIE was negative.  Anti-CCP was not done  A follow-up work-up was done on 1/18/2021 after a repeat CT scan on 12/4/2010 showed progression of UIP pattern.   Repeat serology was done on 1/18/2021 which showed a RF 1320 and anti-CCP of 194.  CRP 10.2 and ESR 41  He was started on anti-Fibrotic Esbriet in the summer of 2020 and he is currently on it.     HPI:  Reviewed the results of the pulmonary function test and high-resolution CT scan as above.  There is no progression in the restrictive lung disease on the PFT on 03/15/23  comparing to DEC 2022  There is however progression comparing to March 2022  started on MMF 1500 mg twice daily in January 2021 and has been on it since  also on  mg daily  Repeat high-resolution CT scan as above showing mild progression in the left basilar honeycombing pattern consistent with progression of UIP findings.  ESR and CRP normal now     Had a fall and broke his wrist     OA of bilateral knees  Reports an episode of left knee swelling and pain which required a visit to the emergency room  Unclear if he has a history of gout  Reviewed x-rays of the right knee in the chart  Medial joint space narrowing  Already seen by orthopedic surgery and they recommended total knee arthroplasty, he still thinking about it      Comprehensive ROS including General, CNS, ENT, GI, Eyes, Mouth, Skin,  Kidney/Urine/Bladder , Heart and lungs and Muscle/ joints/ bones negative except for : HPI    Fhx: Mother had diabetes and coronary artery disease, father with A. fib and COPD  Social history: Works as a , quit smoking 5 years ago, smoked for 20+ years, denies drug use or alcohol use  Past medical history: Pulmonary fibrosis  NKDA    OBJECTIVE: updated 08/05/24  Visit Vitals  /84 (BP Location: RUE - Right upper extremity, Patient Position: Sitting, Cuff Size: Regular)   Pulse 87   Temp 98.1 °F (36.7 °C) (Oral)   Resp 16   Ht 5' 10\" (1.778 m)   Wt 102.6 kg  (226 lb 3.1 oz)   SpO2 94%   BMI 32.46 kg/m²     A&O, NAD  Skin: with out rash or jaundice, nail clubbing  Rheumatoid nodule on the extensor surface of the left arm    Eyes: No conjunctival injection  ENT: MMM, no oral/nasal ulcers  Lungs: Fine crackles at the lung bases, left more than right  Heart: RRR, no murmurs, rubs or gallops  MSK:  No tenderness to palpation or swelling in the MCP or PIP joints  Bony enlargement of PIP and DIP joints  Normal hand  B/L  Knees within normal limits  No synovitis of the MTP joints  No tenderness to palpation or swelling in both ankles    Pics on 08/3/2022:   Rheumatoid nodules       Clubbing           Assessment:    64-year-old male with RA-ILD.  Very high titer RF and high titer anti-CCP, UIP pattern which is commonly seen in RA-ILD.  Stable with very small progression on the last PFT and HRCT    RA-ILD with UIP pattern, elevated ESR and CRP  - PFT 12/14/22: FVC 72%, DLCO 51%, 3/15/2023: FVC 68%, DLCO 53%  - on MMF since Jan 2021, consider future switch to Rituxan or Abatacept   - on Ofev   - on  mg qd   - on prednisone 5 mg qd   - ESR and CRP normal now   - PFT as above , stable   - RA-ILD with UIP patter is usually less responsive to treatment in older patients and less response to treatment comparing to other patterns.     RA with RF of 1320 and Anti-CCP of 194  - on  mg qd for the arthritis part   - will need a yearly eye exam after the 5 year radha     Long-term use of high-risk medication  - check AST, ALT, CBC today and repeat in 3 months     Indeterminant TB test but no risk factors    OA bilateral knees:  -Worse on the right  Already seen by Ortho  The recommended total knee arthroplasty    Osteoporosis per DEXA scan 11/3/2023  Undergoing dental work  Will start alendronate as soon as he is done  Calcium 1200 mg/day  Vitamin D 2000 international units/day.      Plan:  Orders Placed This Encounter   • CBC with Automated Differential   • SGOT   • SGPT    • Sedimentation Rate   • C Reactive Protein   • BUN/Creatinine Ratio     AVS instructions:   Consider right knee replacement given the advanced osteoarthritis  Continue mycophenolate, prednisone, Ofev, HCQ    Follow up in 6 months    DISCHARGE

## 2025-03-14 ENCOUNTER — EMERGENCY (EMERGENCY)
Facility: HOSPITAL | Age: 25
LOS: 0 days | Discharge: HOME | End: 2025-03-15
Attending: EMERGENCY MEDICINE
Payer: COMMERCIAL

## 2025-03-14 VITALS
HEIGHT: 67 IN | RESPIRATION RATE: 18 BRPM | SYSTOLIC BLOOD PRESSURE: 136 MMHG | WEIGHT: 224.65 LBS | HEART RATE: 82 BPM | TEMPERATURE: 99 F | OXYGEN SATURATION: 99 % | DIASTOLIC BLOOD PRESSURE: 84 MMHG

## 2025-03-14 PROCEDURE — 96374 THER/PROPH/DIAG INJ IV PUSH: CPT

## 2025-03-14 PROCEDURE — 80053 COMPREHEN METABOLIC PANEL: CPT

## 2025-03-14 PROCEDURE — 81003 URINALYSIS AUTO W/O SCOPE: CPT

## 2025-03-14 PROCEDURE — 83605 ASSAY OF LACTIC ACID: CPT

## 2025-03-14 PROCEDURE — 76830 TRANSVAGINAL US NON-OB: CPT

## 2025-03-14 PROCEDURE — 86901 BLOOD TYPING SEROLOGIC RH(D): CPT

## 2025-03-14 PROCEDURE — 85025 COMPLETE CBC W/AUTO DIFF WBC: CPT

## 2025-03-14 PROCEDURE — 86900 BLOOD TYPING SEROLOGIC ABO: CPT

## 2025-03-14 PROCEDURE — 36415 COLL VENOUS BLD VENIPUNCTURE: CPT

## 2025-03-14 PROCEDURE — 99284 EMERGENCY DEPT VISIT MOD MDM: CPT | Mod: 25

## 2025-03-14 PROCEDURE — 96375 TX/PRO/DX INJ NEW DRUG ADDON: CPT

## 2025-03-14 PROCEDURE — 86850 RBC ANTIBODY SCREEN: CPT

## 2025-03-14 PROCEDURE — 99285 EMERGENCY DEPT VISIT HI MDM: CPT

## 2025-03-14 PROCEDURE — 83690 ASSAY OF LIPASE: CPT

## 2025-03-14 PROCEDURE — 84702 CHORIONIC GONADOTROPIN TEST: CPT

## 2025-03-14 RX ORDER — ONDANSETRON HCL/PF 4 MG/2 ML
4 VIAL (ML) INJECTION ONCE
Refills: 0 | Status: COMPLETED | OUTPATIENT
Start: 2025-03-14 | End: 2025-03-14

## 2025-03-14 RX ORDER — ACETAMINOPHEN 500 MG/5ML
650 LIQUID (ML) ORAL ONCE
Refills: 0 | Status: COMPLETED | OUTPATIENT
Start: 2025-03-14 | End: 2025-03-14

## 2025-03-14 RX ORDER — IOHEXOL 350 MG/ML
30 INJECTION, SOLUTION INTRAVENOUS ONCE
Refills: 0 | Status: DISCONTINUED | OUTPATIENT
Start: 2025-03-14 | End: 2025-03-14

## 2025-03-14 RX ORDER — KETOROLAC TROMETHAMINE 30 MG/ML
15 INJECTION, SOLUTION INTRAMUSCULAR; INTRAVENOUS ONCE
Refills: 0 | Status: DISCONTINUED | OUTPATIENT
Start: 2025-03-14 | End: 2025-03-14

## 2025-03-14 RX ADMIN — Medication 20 MILLIGRAM(S): at 23:42

## 2025-03-14 RX ADMIN — Medication 4 MILLIGRAM(S): at 23:41

## 2025-03-14 RX ADMIN — Medication 1000 MILLILITER(S): at 23:42

## 2025-03-14 NOTE — ED ADULT TRIAGE NOTE - CHIEF COMPLAINT QUOTE
Pt presents to the ED w/ c/o generalized abdominal pain associated w/ lower back pain x1 day. Denies painful urination Denies bleeding. LMP 3/13

## 2025-03-15 DIAGNOSIS — O36.80X0 PREGNANCY WITH INCONCLUSIVE FETAL VIABILITY, NOT APPLICABLE OR UNSPECIFIED: ICD-10-CM

## 2025-03-15 LAB
ALBUMIN SERPL ELPH-MCNC: 4.6 G/DL — SIGNIFICANT CHANGE UP (ref 3.5–5.2)
ALP SERPL-CCNC: 127 U/L — HIGH (ref 30–115)
ALT FLD-CCNC: 24 U/L — SIGNIFICANT CHANGE UP (ref 0–41)
ANION GAP SERPL CALC-SCNC: 14 MMOL/L — SIGNIFICANT CHANGE UP (ref 7–14)
APPEARANCE UR: CLEAR — SIGNIFICANT CHANGE UP
AST SERPL-CCNC: 32 U/L — SIGNIFICANT CHANGE UP (ref 0–41)
BASOPHILS # BLD AUTO: 0.02 K/UL — SIGNIFICANT CHANGE UP (ref 0–0.2)
BASOPHILS NFR BLD AUTO: 0.3 % — SIGNIFICANT CHANGE UP (ref 0–1)
BILIRUB SERPL-MCNC: 0.4 MG/DL — SIGNIFICANT CHANGE UP (ref 0.2–1.2)
BILIRUB UR-MCNC: NEGATIVE — SIGNIFICANT CHANGE UP
BLD GP AB SCN SERPL QL: SIGNIFICANT CHANGE UP
BUN SERPL-MCNC: 11 MG/DL — SIGNIFICANT CHANGE UP (ref 10–20)
CALCIUM SERPL-MCNC: 9.4 MG/DL — SIGNIFICANT CHANGE UP (ref 8.4–10.5)
CHLORIDE SERPL-SCNC: 101 MMOL/L — SIGNIFICANT CHANGE UP (ref 98–110)
CO2 SERPL-SCNC: 23 MMOL/L — SIGNIFICANT CHANGE UP (ref 17–32)
COLOR SPEC: YELLOW — SIGNIFICANT CHANGE UP
CREAT SERPL-MCNC: 1 MG/DL — SIGNIFICANT CHANGE UP (ref 0.7–1.5)
DIFF PNL FLD: NEGATIVE — SIGNIFICANT CHANGE UP
EGFR: 81 ML/MIN/1.73M2 — SIGNIFICANT CHANGE UP
EOSINOPHIL # BLD AUTO: 0.2 K/UL — SIGNIFICANT CHANGE UP (ref 0–0.7)
EOSINOPHIL NFR BLD AUTO: 3 % — SIGNIFICANT CHANGE UP (ref 0–8)
GLUCOSE SERPL-MCNC: 75 MG/DL — SIGNIFICANT CHANGE UP (ref 70–99)
GLUCOSE UR QL: NEGATIVE MG/DL — SIGNIFICANT CHANGE UP
HCG SERPL-ACNC: 996.8 MIU/ML — HIGH
HCT VFR BLD CALC: 41.8 % — SIGNIFICANT CHANGE UP (ref 37–47)
HGB BLD-MCNC: 13.6 G/DL — SIGNIFICANT CHANGE UP (ref 12–16)
IMM GRANULOCYTES NFR BLD AUTO: 0.3 % — SIGNIFICANT CHANGE UP (ref 0.1–0.3)
KETONES UR-MCNC: NEGATIVE MG/DL — SIGNIFICANT CHANGE UP
LACTATE SERPL-SCNC: 0.9 MMOL/L — SIGNIFICANT CHANGE UP (ref 0.7–2)
LEUKOCYTE ESTERASE UR-ACNC: NEGATIVE — SIGNIFICANT CHANGE UP
LIDOCAIN IGE QN: 24 U/L — SIGNIFICANT CHANGE UP (ref 7–60)
LYMPHOCYTES # BLD AUTO: 1.54 K/UL — SIGNIFICANT CHANGE UP (ref 1.2–3.4)
LYMPHOCYTES # BLD AUTO: 23 % — SIGNIFICANT CHANGE UP (ref 20.5–51.1)
MCHC RBC-ENTMCNC: 30 PG — SIGNIFICANT CHANGE UP (ref 27–31)
MCHC RBC-ENTMCNC: 32.5 G/DL — SIGNIFICANT CHANGE UP (ref 32–37)
MCV RBC AUTO: 92.3 FL — SIGNIFICANT CHANGE UP (ref 81–99)
MONOCYTES # BLD AUTO: 0.41 K/UL — SIGNIFICANT CHANGE UP (ref 0.1–0.6)
MONOCYTES NFR BLD AUTO: 6.1 % — SIGNIFICANT CHANGE UP (ref 1.7–9.3)
NEUTROPHILS # BLD AUTO: 4.52 K/UL — SIGNIFICANT CHANGE UP (ref 1.4–6.5)
NEUTROPHILS NFR BLD AUTO: 67.3 % — SIGNIFICANT CHANGE UP (ref 42.2–75.2)
NITRITE UR-MCNC: NEGATIVE — SIGNIFICANT CHANGE UP
NRBC BLD AUTO-RTO: 0 /100 WBCS — SIGNIFICANT CHANGE UP (ref 0–0)
PH UR: 5.5 — SIGNIFICANT CHANGE UP (ref 5–8)
PLATELET # BLD AUTO: 233 K/UL — SIGNIFICANT CHANGE UP (ref 130–400)
PMV BLD: 12.1 FL — HIGH (ref 7.4–10.4)
POTASSIUM SERPL-MCNC: 4.5 MMOL/L — SIGNIFICANT CHANGE UP (ref 3.5–5)
POTASSIUM SERPL-SCNC: 4.5 MMOL/L — SIGNIFICANT CHANGE UP (ref 3.5–5)
PROT SERPL-MCNC: 8.3 G/DL — HIGH (ref 6–8)
PROT UR-MCNC: SIGNIFICANT CHANGE UP MG/DL
RBC # BLD: 4.53 M/UL — SIGNIFICANT CHANGE UP (ref 4.2–5.4)
RBC # FLD: 13.3 % — SIGNIFICANT CHANGE UP (ref 11.5–14.5)
SODIUM SERPL-SCNC: 138 MMOL/L — SIGNIFICANT CHANGE UP (ref 135–146)
SP GR SPEC: 1.03 — SIGNIFICANT CHANGE UP (ref 1–1.03)
UROBILINOGEN FLD QL: 1 MG/DL — SIGNIFICANT CHANGE UP (ref 0.2–1)
WBC # BLD: 6.71 K/UL — SIGNIFICANT CHANGE UP (ref 4.8–10.8)
WBC # FLD AUTO: 6.71 K/UL — SIGNIFICANT CHANGE UP (ref 4.8–10.8)

## 2025-03-15 PROCEDURE — 76830 TRANSVAGINAL US NON-OB: CPT | Mod: 26

## 2025-03-15 NOTE — ED PROVIDER NOTE - NSFOLLOWUPCLINICS_GEN_ALL_ED_FT
[facility]  OB/GYN  [address] [place] [address]  Phone: [phone]  Fax:   Follow Up Time: 1-3 Days

## 2025-03-15 NOTE — ED PROVIDER NOTE - NSFOLLOWUPINSTRUCTIONS_ED_ALL_ED_FT
FOLLOW UP WITH YOUR GYN DOCTOR THIS WEEK FOR REEVALUATION.     WILL NEED REPEAT BLOOD WORK IN 48 HOURS, PRESCRIPTION PROVIDED    RETURN TO ED IMMEDIATELY WITH ANY WORSENING SYMPTOMS, CHEST PAIN, SHORTNESS OF BREATH, ABDOMINAL PAIN, FEVERS, WEAKNESS, DIZZINESS, PERSISTENT OR SEVERE HEADACHE OR ANY OTHER CONCERNS.    Abdominal Pain in Pregnancy    AMBULATORY CARE:    Abdominal pain during pregnancy is common. Some of the causes include heartburn, constipation, gas, false labor, and round ligament pain. Round ligament pain is caused by stretching of the ligaments that support your uterus. Abdominal pain may be caused by a health problem, such as a stomach virus or appendicitis (inflammation of the appendix). The pain may also be caused by a problem with your pregnancy, such as a threatened miscarriage or  labor.    Call your local emergency number (911 in the [place]) if:    You have a fast heartbeat.    You have shortness of breath.    You feel lightheaded or faint.  Seek care immediately if:    You have sudden, severe pain or cramps that are so bad that you cannot walk or talk.    You have vaginal bleeding or discharge.    You have nausea, vomiting, fever, and severe pain on your right side.  Call your obstetrician if:    You have light vaginal bleeding or spotting.    You continue to have abdominal pain that cannot be relieved.    You have a fever.    You have questions or concerns about your condition or care.  Treatment will depend on the cause of your pain. Ask your healthcare provider before you take any medicine during pregnancy, including over-the-counter pain medicines. Acetaminophen may be recommended. Ask how much to take and how often to take it. Follow directions. Acetaminophen can cause liver damage if not taken correctly. Your provider will tell you how much is safe to take each day during pregnancy. Too much medicine can be harmful to your baby. Read the labels of all other medicines you are using to see if they also contain acetaminophen, or ask your doctor or pharmacist.    Self-care:    Rest as needed. Rest may help to relieve pain. Your healthcare provider may recommend that you rest on your side instead of on your back. He or she may tell you to lie on your left side, if possible. Place a pillow under your abdomen. Keep another pillow between your knees. Ask your provider about other ways to relieve this pain, such as a supportive belt or pregnancy exercises.    Do not lie flat in bed or bend over if you have heartburn. Ask your obstetrician if you should make any changes to the foods you eat. Ask if you can take any medicines for heartburn.  Prevent GERD      Move slowly. Avoid quick changes in position or movements that cause pain.    Exercise as directed. Gentle exercise can keep the ligaments loose and strengthen core (abdominal) muscles. An example is swimming, or a yoga program designed for pregnancy. Ask your healthcare provider which exercises are safe for you and how often to exercise. For most healthy women, a good goal is to try to get at least 30 minutes of exercise every day. If activity causes pain, try not to walk too long or too far at one time. Break your exercise up into short amounts.  Pregnancy Yoga      Apply a warm compress to the area. Warmth can relieve pain and muscle spasms. Ask your healthcare provider if you can take a warm bath or use a heating pad. Keep all heat settings low. High heat can be dangerous for your baby. Do not sit in a hot tub or use hot water in your bath. You may also be able to massage the area gently while you are applying heat. Massage can help relieve pain.    Eat more fiber and drink more liquids to relieve constipation. Fiber is found in fruits, vegetables, and whole-grain foods, such as whole-wheat bread and cereals. Ask how much liquid to drink each day and which liquids are best for you.    Follow up with your obstetrician within 3 days or as directed: Write down your questions so you remember to ask them during your visits.    Ectopic Pregnancy    An ectopic pregnancy happens when a fertilized egg attaches (implants) outside the uterus. In a normal pregnancy, a fertilized egg implants in the uterus. An ectopic pregnancy cannot develop into a healthy baby. Most ectopic pregnancies occur in one of the fallopian tubes, which is where an egg travels from an ovary to get to the uterus. This is called a tubal pregnancy. An ectopic pregnancy can also happen on an ovary, on the cervix, or in the abdomen.    When a fertilized egg implants on tissue outside the uterus and begins to grow, it may cause the tissue to tear or burst. This is known as a ruptured ectopic pregnancy. The tear or burst causes internal bleeding. This may cause intense pain in the abdomen. An ectopic pregnancy is a medical emergency and can be life-threatening.    What are the causes?  The most common cause of this condition is damage to one of the fallopian tubes. A fallopian tube may be narrowed or blocked, and that stops the fertilized egg from reaching the uterus.    Sometimes, the cause of this condition is not known.    What increases the risk?  The following factors may make you more likely to develop this condition:  Having gone through infertility treatment before.  Having had an ectopic pregnancy before.  Having had surgery to have the fallopian tubes tied.  Becoming pregnant while using an intrauterine device for birth control.  Taking birth control pills before the age of 16.  Other risk factors include:  Smoking.  Alcohol use.  History of DUGLAS exposure. DUGLAS is a medicine that was used until  and affected babies whose mothers took the medicine.  What are the signs or symptoms?  Common symptoms of this condition include:  Missing a menstrual period.  Nausea or tiredness.  Tender breasts.  Other normal pregnancy symptoms.  Other symptoms may include:  Pain during sex.  Vaginal bleeding or spotting.  Cramping or pain in the lower abdomen.  A fast heartbeat, low blood pressure, and sweating.  Pain or increased pressure while having a bowel movement.  Symptoms of a ruptured ectopic pregnancy and internal bleeding may include:  Sudden, severe pain in the abdomen.  Dizziness, weakness, feeling light-headed, or fainting.  Pain in the shoulder or neck area.  How is this diagnosed?  This condition is diagnosed by:  A blood test to check for the pregnancy hormone.  A pelvic exam to find painful areas or a mass in the abdomen.  Ultrasound. A probe is inserted into the vagina to see if there is a pregnancy in or outside the uterus.  Taking a sample of tissue from the uterus.  Surgery to look closely at the fallopian tubes through an incision in the abdomen.  How is this treated?  This condition is usually treated with medicine or surgery. Sometimes, ectopic pregnancies can resolve on their own, under close monitoring by your health care provider.    Medicine    A medicine called methotrexate may be given to cause the pregnancy tissue to be absorbed. The medicine may be given if:  The diagnosis is made early, with no signs of active bleeding.  The fallopian tube has not torn or burst.  You will need blood tests to make sure the medicine is working. It may take 4–6 weeks for the pregnancy tissues to be absorbed.    Surgery    Surgery may be performed to:  Remove the pregnancy tissue.  Stop internal bleeding.  Remove part or all of the fallopian tube.  Remove the uterus. This is rare.  After surgery, you may need to have blood tests to make sure the surgery worked.    Follow these instructions at home:  Medicines    Take over-the-counter and prescription medicines only as told by your health care provider.  Ask your health care provider if the medicine prescribed to you:  Requires you to avoid driving or using machinery.  Can cause constipation. You may need to take these actions to prevent or treat constipation:  Drink enough fluid to keep your urine pale yellow.  Take over-the-counter or prescription medicines.  Eat foods that are high in fiber, such as beans, whole grains, and fresh fruits and vegetables.  Limit foods that are high in fat and processed sugars, such as fried or sweet foods.  General instructions    Rest or limit your activity, if told by your health care provider.  Do not have sex or put anything in your vagina, such as tampons or douches, for 6 weeks or until your health care provider says it is safe.  Do not lift anything that is heavier than 10 lb (4.5 kg), or the limit that you are told, until your health care provider says that it is safe.  Return to your normal activities as told by your health care provider. Ask your health care provider what activities are safe for you.  Keep all follow-up visits. This is important.  Contact a health care provider if:  You have a fever or chills.  You have nausea and vomiting.  Get help right away if:  Your pain gets worse or is not relieved by medicine.  You feel dizzy or weak.  You feel light-headed or you faint.  You have sudden, severe pain in your abdomen.  You have sudden pain in the shoulder or neck area.  Summary  An ectopic pregnancy happens when a fertilized egg implants outside the uterus. Most ectopic pregnancies occur in one of the fallopian tubes.  An ectopic pregnancy is a medical emergency and can be life-threatening.  The most common cause of this condition is damage to one of the fallopian tubes.  This condition is usually treated with medicine or surgery. Some ectopic pregnancies resolve on their own, under close monitoring by your health care provider.  This information is not intended to replace advice given to you by your health care provider. Make sure you discuss any questions you have with your health care provider.

## 2025-03-15 NOTE — ED ADULT NURSE NOTE - NSFALLLASTSIX_ED_ALL_ED
Anesthesia Evaluation     . Pt has had prior anesthetic. Type: General    No history of anesthetic complications          ROS/MED HX    ENT/Pulmonary:  - neg pulmonary ROS     Neurologic: Comment: vertigo    (+)migraines,     Cardiovascular:     (+) ----. : . . . :. valvular problems/murmurs no symptoms:.       METS/Exercise Tolerance:     Hematologic:         Musculoskeletal:         GI/Hepatic:     (+) GERD Asymptomatic on medication,       Renal/Genitourinary:     (+) Nephrolithiasis ,    (-) renal disease   Endo:         Psychiatric:         Infectious Disease:         Malignancy:         Other:                     Physical Exam  Normal systems: cardiovascular, pulmonary and dental    Airway   Mallampati: I  TM distance: >3 FB  Neck ROM: full    Dental     Cardiovascular   Rhythm and rate: regular and normal      Pulmonary    breath sounds clear to auscultation                    Anesthesia Plan      History & Physical Review  History and physical reviewed and following examination; no interval change.    ASA Status:  2 .    NPO Status:  > 8 hours    Plan for General and LMA with Propofol induction. Maintenance will be TIVA.    PONV prophylaxis:  Ondansetron (or other 5HT-3) and Dexamethasone or Solumedrol       Postoperative Care  Postoperative pain management:  IV analgesics and Oral pain medications.      Consents  Anesthetic plan, risks, benefits and alternatives discussed with:  Patient..                          .   No.

## 2025-03-15 NOTE — ED PROVIDER NOTE - PHYSICAL EXAMINATION
CONSTITUTIONAL: Well-developed; well-nourished; in no acute distress.   SKIN: warm, dry  HEAD: Normocephalic; atraumatic.  EYES: PERRL, EOMI, no conjunctival erythema  ENT: No nasal discharge; airway clear.  NECK: Supple; non tender.  CARD: S1, S2 normal; no murmurs, gallops, or rubs. Regular rate and rhythm.   RESP: No wheezes, rales or rhonchi.  ABD: soft nd, tenderness in the LLQ. no cva tenderness.   EXT: Normal ROM.  No clubbing, cyanosis or edema.   NEURO: Alert, oriented, grossly unremarkable.  PSYCH: Cooperative, appropriate.

## 2025-03-15 NOTE — ED PROVIDER NOTE - PATIENT PORTAL LINK FT
You can access the FollowMyHealth Patient Portal offered by [facility] by registering at the following website: [email or web address]. By joining [facility]’s FollowMyHealth portal, you will also be able to view your health information using other applications (apps) compatible with our system.

## 2025-03-15 NOTE — ED ADULT NURSE NOTE - NSFALLUNIVINTERV_ED_ALL_ED
Bed/Stretcher in lowest position, wheels locked, appropriate side rails in place/Call bell, personal items and telephone in reach/Instruct patient to call for assistance before getting out of bed/chair/stretcher/Non-slip footwear applied when patient is off stretcher/[place] to call system/Physically safe environment - no spills, clutter or unnecessary equipment/Purposeful proactive rounding/Room/bathroom lighting operational, light cord in reach

## 2025-03-15 NOTE — ED PROVIDER NOTE - OBJECTIVE STATEMENT
23 yo F , hx of ectopic pregnancy to the left adnexa treated medically in 2024 p/w left sided abd pain intermittently x 1 week however worsening today radiating to the lower back associated with NBNB vomiting. denies fevers. denies diarrhea. states she had mild vaginal spotting earlier this week that has resolved, no vaginal bleeding or discharge today. denies chest pain or sob. patient pregnant in the ED, LMP is .

## 2025-03-15 NOTE — ED PROVIDER NOTE - CLINICAL SUMMARY MEDICAL DECISION MAKING FREE TEXT BOX
Patient evaluated for abdominal discomfort, found to be pregnant, Rh+.  TVUS without evidence of pregnancy noted, possible early gestational sac.  GYN consulted, recommended serial hCG and patient given prescription for repeat labs.  Advise close follow-up with GYN and patient agrees.  Patient reported feeling well to go home.  All labs discussed.  Strict return precautions advised and patient verbalized understanding.

## 2025-03-15 NOTE — CONSULT NOTE ADULT - SUBJECTIVE AND OBJECTIVE BOX
PGY 2 CONSULT NOTE     HPI: 25yo , LMP 25, PMHx chronic back pain, presents for worsening lower abdominal cramping for the past week. Patient reports LLQ and pelvic cramping pain for the past week that progressively worsened. Reports pain was 8/10 intensity earlier last night, pain now improved. Denies any fevers/chills, lightheadedness/dizziness, nausea/vomiting, or vaginal bleeding. This pregnancy was unplanned but desired. Reports h/o left ectopic pregnancy s/p MTX in . Follows with OBGYN on [address] (unsure OBGYN name).     Ob/Gyn History:                 LMP -    25          - FT  x1  - SAB x3 w/ D&C x3  - eTOP x1 w/ D&C  - h/o left ectopic pregnancy s/p MTX ()       Denies history of ovarian cysts, uterine fibroids, abnormal paps, or STIs      Denies the following: constitutional symptoms, visual symptoms, cardiovascular symptoms, respiratory symptoms, GI symptoms, musculoskeletal symptoms, skin symptoms, neurologic symptoms, hematologic symptoms, allergic symptoms, psychiatric symptoms  Except any pertinent positives listed.     Home Medications: denies      Allergies  penicillin (Other)        PAST MEDICAL & SURGICAL HISTORY:  No pertinent past medical history          FAMILY HISTORY:      SOCIAL HISTORY: Denies cigarette use, alcohol use, or illicit drug use    Vital Signs Last 24 Hrs  T(F): 98.9 (14 Mar 2025 22:02), Max: 98.9 (14 Mar 2025 22:02)  HR: 82 (14 Mar 2025 22:02) (82 - 82)  BP: 136/84 (14 Mar 2025 22:02) (136/84 - 136/84)  RR: 18 (14 Mar 2025 22:02) (18 - 18)  Height (cm): 170.2 (25 @ 22:02)  Weight (kg): 101.9 (25 @ 22:02)  BMI (kg/m2): 35.2 (25 @ 22:02)  BSA (m2): 2.12 (25 @ 22:02)    General Appearance - AAOx3, NAD  Heart - S1S2 regular rate and rhythm  Lung - CTA Bilaterally  Abdomen - Soft, non-tender, nondistended, no rebound, no rigidity, no guarding, bowel sounds present    GYN/Pelvis:  External genitalia - Normal  Vagina - Normal, no lesions or bleeding noted  Cervix - Normal, no lesions or bleeding noted    Uterus:  Size - Normal  Tenderness - None  Mass - None  Freely mobile    Adnexa:  Masses - None  Tenderness - None      Meds:   (ADM OVERRIDE) 1 each &lt;see task&gt; GiveOnce  (ADM OVERRIDE) 1 each &lt;see task&gt; GiveOnce  (ADM OVERRIDE) 1 each &lt;see task&gt; GiveOnce  acetaminophen     Tablet .. 650 milliGRAM(s) Oral once  famotidine Injectable 20 milliGRAM(s) IV Push once  ondansetron Injectable 4 milliGRAM(s) IV Push once  sodium chloride 0.9% Bolus 1000 milliLiter(s) IV Bolus once    Height (cm): 170.2 (25 @ 22:02)  Weight (kg): 101.9 (25 @ 22:02)  BMI (kg/m2): 35.2 (25 @ 22:02)  BSA (m2): 2.12 (25 @ 22:02)    LABS:                        13.6   6.71  )-----------( 233      ( 15 Mar 2025 00:32 )             41.8     HCG Quantitative, Serum: 996.8 mIU/mL (03-15-25 @ 00:32)    ABO RH Interpretation: O POS (03-15-25 @ 00:32)  Antibody Screen: NEG (03-15-25 @ 00:32)    03-15    138  |  101  |  11  ----------------------------<  75  4.5   |  23  |  1.0    Ca    9.4      15 Mar 2025 00:32    TPro  8.3[H]  /  Alb  4.6  /  TBili  0.4  /  DBili  x   /  AST  32  /  ALT  24  /  AlkPhos  127[H]  03-15      Urinalysis Basic - ( 15 Mar 2025 00:32 )    Color: Yellow / Appearance: Clear / S.030 / pH: x  Gluc: 75 mg/dL / Ketone: Negative mg/dL  / Bili: Negative / Urobili: 1.0 mg/dL   Blood: x / Protein: Trace mg/dL / Nitrite: Negative   Leuk Esterase: Negative / RBC: x / WBC x   Sq Epi: x / Non Sq Epi: x / Bacteria: x          RADIOLOGY & ADDITIONAL STUDIES:  < from: US Transvaginal (03.15.25 @ 01:10) >    ACC: [id number] EXAM:  US TRANSVAGINAL   ORDERED BY: MAGGIE TUTTLE     PROCEDURE DATE:  03/15/2025          INTERPRETATION:  CLINICAL INFORMATION: Pregnancy evaluation.    LMP: 2025    COMPARISON: 2024    TECHNIQUE:  Endovaginal pelvic sonogram only. Color and Spectral Doppler was   performed.    FINDINGS:  Uterus: 7.8 cm x 4.5 cm x 5.6 cm. Within normal limits.    Subcentimeter endometrial cystic structure.      Right ovary: 3.1 cm x 1.8 cm x 2.4 cm. Within normal limits. Corpus   luteum 2 cm. Doppler flow demonstrated.  Left ovary: 2.4 cm x 1.5 cm x 2.2 cm. Within normal limits. Doppler flow   demonstrated.    Fluid: None.    IMPRESSION:  1.  No evidence of intrauterine pregnancy. No adnexal masses.  2.  Subcentimeter endometrialcystic structure, nonspecific. Early   gestational sac not excluded. Correlate with serum beta-hCG. Follow-up as   indicated.    --- End of Report ---            ESTEBAN ZAPATA MD; Attending Radiologist  This document has been electronically signed. Mar 15 2025  1:43AM    < end of copied text >

## 2025-03-15 NOTE — ED PROVIDER NOTE - ADDITIONAL NOTES AND INSTRUCTIONS:
PT SHOULD BE ON LIGHT DUTY DUE TO A MEDICAL ISSUE UNTIL REASSESSED AND CLEARED BY FOLLOW UP PHYSICIAN

## 2025-03-15 NOTE — CONSULT NOTE ADULT - ASSESSMENT
25yo , LMP 25, PMHx chronic back pain, h/o ectopic pregnancy (), presents with PUL. Currently clinically and hemodynamically stable.  - Recommend to trend HCG values outpatient in 48 hours. Patient instructed to repeat hcg on  and , escripts provided  - Ectopic and bleeding precautions advised  - Recommend PO Tylenol 975mg q6hrs PRN for pain management  - no acute gyn intervention at this time, dispo per ED

## 2025-03-17 ENCOUNTER — OUTPATIENT (OUTPATIENT)
Dept: OUTPATIENT SERVICES | Facility: HOSPITAL | Age: 25
LOS: 1 days | End: 2025-03-17
Payer: COMMERCIAL

## 2025-03-17 DIAGNOSIS — O36.80X0 PREGNANCY WITH INCONCLUSIVE FETAL VIABILITY, NOT APPLICABLE OR UNSPECIFIED: ICD-10-CM

## 2025-03-17 PROCEDURE — 84702 CHORIONIC GONADOTROPIN TEST: CPT

## 2025-03-17 PROCEDURE — 36415 COLL VENOUS BLD VENIPUNCTURE: CPT

## 2025-03-18 DIAGNOSIS — O36.80X0 PREGNANCY WITH INCONCLUSIVE FETAL VIABILITY, NOT APPLICABLE OR UNSPECIFIED: ICD-10-CM

## 2025-03-18 LAB — HCG SERPL-MCNC: 3554 MIU/ML

## 2025-03-20 ENCOUNTER — APPOINTMENT (OUTPATIENT)
Dept: OBGYN | Facility: CLINIC | Age: 25
End: 2025-03-20
Payer: COMMERCIAL

## 2025-03-20 ENCOUNTER — OUTPATIENT (OUTPATIENT)
Dept: OUTPATIENT SERVICES | Facility: HOSPITAL | Age: 25
LOS: 1 days | End: 2025-03-20
Payer: COMMERCIAL

## 2025-03-20 VITALS
SYSTOLIC BLOOD PRESSURE: 129 MMHG | WEIGHT: 225 LBS | HEART RATE: 80 BPM | HEIGHT: 67 IN | DIASTOLIC BLOOD PRESSURE: 78 MMHG | BODY MASS INDEX: 35.31 KG/M2 | OXYGEN SATURATION: 97 %

## 2025-03-20 DIAGNOSIS — Z32.01 ENCOUNTER FOR PREGNANCY TEST, RESULT POSITIVE: ICD-10-CM

## 2025-03-20 PROCEDURE — 99214 OFFICE O/P EST MOD 30 MIN: CPT

## 2025-03-20 PROCEDURE — 76815 OB US LIMITED FETUS(S): CPT | Mod: 26

## 2025-03-20 PROCEDURE — 76857 US EXAM PELVIC LIMITED: CPT | Mod: 26

## 2025-03-20 NOTE — CHART NOTE - NSCHARTNOTEFT_GEN_A_CORE
PGY1 Note    HPI: 25yo , LMP 25, PMHx chronic back pain, presents for worsening lower abdominal cramping for the past week. Patient reports LLQ and pelvic cramping pain for the past week that progressively worsened. Reports pain was 8/10 intensity earlier last night, pain now improved. Denies any fevers/chills, lightheadedness/dizziness, nausea/vomiting, or vaginal bleeding. This pregnancy was unplanned but desired. Reports h/o left ectopic pregnancy s/p MTX in . Follows with OBGYN on [address] [facility] (unsure OBGYN name).     Labs showed:   3/15 6.71>13.6/41.8<233, 138/4.5/101///<75, AST/ALT 32/24, , O pos    Imaging:  ACC: [id number] EXAM:  US TRANSVAGINAL   ORDERED BY: MAGGIE TUTTLE   PROCEDURE DATE:  03/15/2025    INTERPRETATION:  CLINICAL INFORMATION: Pregnancy evaluation.  LMP: 2025  COMPARISON: 2024  TECHNIQUE:  Endovaginal pelvic sonogram only. Color and Spectral Doppler was   performed.    FINDINGS:  Uterus: 7.8 cm x 4.5 cm x 5.6 cm. Within normal limits.  Subcentimeter endometrial cystic structure.  Right ovary: 3.1 cm x 1.8 cm x 2.4 cm. Within normal limits. Corpus   luteum 2 cm. Doppler flow demonstrated.  Left ovary: 2.4 cm x 1.5 cm x 2.2 cm. Within normal limits. Doppler flow   demonstrated.  Fluid: None.    IMPRESSION:  1.  No evidence of intrauterine pregnancy. No adnexal masses.  2.  Subcentimeter endometrialcystic structure, nonspecific. Early   gestational sac not excluded. Correlate with serum beta-hCG. Follow-up as   indicated.    --- End of Report ---    She was assessed as:   PUL, possible left ectopic    Instructed to obtained serial b-hCGs.   3/15 6.71>13.6/41.8<233, 138/4.5/101/23/11/1<75, AST/ALT 32/24, , O pos  3/17 HCG 3554 (256% increase)    Pt presented to [facility] on 3/20 and US showed IUP. Pt does not need further follow up on GYN's beta list.
PGY1 Note    Pt called to assess wellbeing and to remind her to present to [facility] lab appointment on 3/17. Pt unable to be reached, VMx1 left with callback number.

## 2025-03-21 ENCOUNTER — APPOINTMENT (OUTPATIENT)
Dept: OBGYN | Facility: CLINIC | Age: 25
End: 2025-03-21

## 2025-03-21 DIAGNOSIS — Z34.90 ENCOUNTER FOR SUPERVISION OF NORMAL PREGNANCY, UNSPECIFIED, UNSPECIFIED TRIMESTER: ICD-10-CM

## 2025-03-22 ENCOUNTER — EMERGENCY (EMERGENCY)
Facility: HOSPITAL | Age: 25
LOS: 0 days | Discharge: HOME | End: 2025-03-23
Attending: EMERGENCY MEDICINE
Payer: COMMERCIAL

## 2025-03-22 VITALS
HEART RATE: 110 BPM | SYSTOLIC BLOOD PRESSURE: 136 MMHG | RESPIRATION RATE: 18 BRPM | OXYGEN SATURATION: 100 % | DIASTOLIC BLOOD PRESSURE: 80 MMHG | TEMPERATURE: 100 F | WEIGHT: 223.99 LBS | HEIGHT: 67 IN

## 2025-03-22 DIAGNOSIS — Z3A.01 LESS THAN 8 WEEKS GESTATION OF PREGNANCY: ICD-10-CM

## 2025-03-22 DIAGNOSIS — Z88.0 ALLERGY STATUS TO PENICILLIN: ICD-10-CM

## 2025-03-22 DIAGNOSIS — R10.31 RIGHT LOWER QUADRANT PAIN: ICD-10-CM

## 2025-03-22 DIAGNOSIS — O21.9 VOMITING OF PREGNANCY, UNSPECIFIED: ICD-10-CM

## 2025-03-22 DIAGNOSIS — O26.891 OTHER SPECIFIED PREGNANCY RELATED CONDITIONS, FIRST TRIMESTER: ICD-10-CM

## 2025-03-22 DIAGNOSIS — R19.7 DIARRHEA, UNSPECIFIED: ICD-10-CM

## 2025-03-22 DIAGNOSIS — O99.891 OTHER SPECIFIED DISEASES AND CONDITIONS COMPLICATING PREGNANCY: ICD-10-CM

## 2025-03-22 DIAGNOSIS — R50.9 FEVER, UNSPECIFIED: ICD-10-CM

## 2025-03-22 DIAGNOSIS — Z87.59 PERSONAL HISTORY OF OTHER COMPLICATIONS OF PREGNANCY, CHILDBIRTH AND THE PUERPERIUM: ICD-10-CM

## 2025-03-22 LAB
ALBUMIN SERPL ELPH-MCNC: 4 G/DL — SIGNIFICANT CHANGE UP (ref 3.5–5.2)
ALP SERPL-CCNC: 78 U/L — SIGNIFICANT CHANGE UP (ref 30–115)
ALT FLD-CCNC: 33 U/L — SIGNIFICANT CHANGE UP (ref 0–41)
ANION GAP SERPL CALC-SCNC: 12 MMOL/L — SIGNIFICANT CHANGE UP (ref 7–14)
AST SERPL-CCNC: 28 U/L — SIGNIFICANT CHANGE UP (ref 0–41)
BASOPHILS # BLD AUTO: 0.01 K/UL — SIGNIFICANT CHANGE UP (ref 0–0.2)
BASOPHILS NFR BLD AUTO: 0.2 % — SIGNIFICANT CHANGE UP (ref 0–1)
BILIRUB SERPL-MCNC: 0.8 MG/DL — SIGNIFICANT CHANGE UP (ref 0.2–1.2)
BUN SERPL-MCNC: 14 MG/DL — SIGNIFICANT CHANGE UP (ref 10–20)
CALCIUM SERPL-MCNC: 8.9 MG/DL — SIGNIFICANT CHANGE UP (ref 8.4–10.5)
CHLORIDE SERPL-SCNC: 101 MMOL/L — SIGNIFICANT CHANGE UP (ref 98–110)
CO2 SERPL-SCNC: 23 MMOL/L — SIGNIFICANT CHANGE UP (ref 17–32)
CREAT SERPL-MCNC: 1 MG/DL — SIGNIFICANT CHANGE UP (ref 0.7–1.5)
EGFR: 81 ML/MIN/1.73M2 — SIGNIFICANT CHANGE UP
EGFR: 81 ML/MIN/1.73M2 — SIGNIFICANT CHANGE UP
EOSINOPHIL # BLD AUTO: 0 K/UL — SIGNIFICANT CHANGE UP (ref 0–0.7)
EOSINOPHIL NFR BLD AUTO: 0 % — SIGNIFICANT CHANGE UP (ref 0–8)
GLUCOSE SERPL-MCNC: 91 MG/DL — SIGNIFICANT CHANGE UP (ref 70–99)
HCG SERPL-ACNC: HIGH MIU/ML
HCT VFR BLD CALC: 38 % — SIGNIFICANT CHANGE UP (ref 37–47)
HGB BLD-MCNC: 12.7 G/DL — SIGNIFICANT CHANGE UP (ref 12–16)
IMM GRANULOCYTES NFR BLD AUTO: 0.6 % — HIGH (ref 0.1–0.3)
LYMPHOCYTES # BLD AUTO: 0.61 K/UL — LOW (ref 1.2–3.4)
LYMPHOCYTES # BLD AUTO: 9.2 % — LOW (ref 20.5–51.1)
MCHC RBC-ENTMCNC: 29.9 PG — SIGNIFICANT CHANGE UP (ref 27–31)
MCHC RBC-ENTMCNC: 33.4 G/DL — SIGNIFICANT CHANGE UP (ref 32–37)
MCV RBC AUTO: 89.4 FL — SIGNIFICANT CHANGE UP (ref 81–99)
MONOCYTES # BLD AUTO: 0.26 K/UL — SIGNIFICANT CHANGE UP (ref 0.1–0.6)
MONOCYTES NFR BLD AUTO: 3.9 % — SIGNIFICANT CHANGE UP (ref 1.7–9.3)
NEUTROPHILS # BLD AUTO: 5.69 K/UL — SIGNIFICANT CHANGE UP (ref 1.4–6.5)
NEUTROPHILS NFR BLD AUTO: 86.1 % — HIGH (ref 42.2–75.2)
NRBC BLD AUTO-RTO: 0 /100 WBCS — SIGNIFICANT CHANGE UP (ref 0–0)
PLATELET # BLD AUTO: 186 K/UL — SIGNIFICANT CHANGE UP (ref 130–400)
PMV BLD: 11.6 FL — HIGH (ref 7.4–10.4)
POTASSIUM SERPL-MCNC: 3.8 MMOL/L — SIGNIFICANT CHANGE UP (ref 3.5–5)
POTASSIUM SERPL-SCNC: 3.8 MMOL/L — SIGNIFICANT CHANGE UP (ref 3.5–5)
PROT SERPL-MCNC: 7.2 G/DL — SIGNIFICANT CHANGE UP (ref 6–8)
RBC # BLD: 4.25 M/UL — SIGNIFICANT CHANGE UP (ref 4.2–5.4)
RBC # FLD: 13.4 % — SIGNIFICANT CHANGE UP (ref 11.5–14.5)
SODIUM SERPL-SCNC: 136 MMOL/L — SIGNIFICANT CHANGE UP (ref 135–146)
WBC # BLD: 6.61 K/UL — SIGNIFICANT CHANGE UP (ref 4.8–10.8)
WBC # FLD AUTO: 6.61 K/UL — SIGNIFICANT CHANGE UP (ref 4.8–10.8)

## 2025-03-22 PROCEDURE — 36415 COLL VENOUS BLD VENIPUNCTURE: CPT

## 2025-03-22 PROCEDURE — 99285 EMERGENCY DEPT VISIT HI MDM: CPT

## 2025-03-22 PROCEDURE — 85025 COMPLETE CBC W/AUTO DIFF WBC: CPT

## 2025-03-22 PROCEDURE — 0241U: CPT

## 2025-03-22 PROCEDURE — 81003 URINALYSIS AUTO W/O SCOPE: CPT

## 2025-03-22 PROCEDURE — 84702 CHORIONIC GONADOTROPIN TEST: CPT

## 2025-03-22 PROCEDURE — 76830 TRANSVAGINAL US NON-OB: CPT

## 2025-03-22 PROCEDURE — 99284 EMERGENCY DEPT VISIT MOD MDM: CPT | Mod: 25

## 2025-03-22 PROCEDURE — 96374 THER/PROPH/DIAG INJ IV PUSH: CPT

## 2025-03-22 PROCEDURE — 80053 COMPREHEN METABOLIC PANEL: CPT

## 2025-03-22 RX ORDER — ONDANSETRON HCL/PF 4 MG/2 ML
4 VIAL (ML) INJECTION ONCE
Refills: 0 | Status: COMPLETED | OUTPATIENT
Start: 2025-03-22 | End: 2025-03-22

## 2025-03-22 RX ORDER — SODIUM CHLORIDE 9 G/1000ML
2000 INJECTION, SOLUTION INTRAVENOUS ONCE
Refills: 0 | Status: COMPLETED | OUTPATIENT
Start: 2025-03-22 | End: 2025-03-22

## 2025-03-22 RX ORDER — ACETAMINOPHEN 500 MG/5ML
650 LIQUID (ML) ORAL ONCE
Refills: 0 | Status: COMPLETED | OUTPATIENT
Start: 2025-03-22 | End: 2025-03-22

## 2025-03-22 RX ADMIN — SODIUM CHLORIDE 2000 MILLILITER(S): 9 INJECTION, SOLUTION INTRAVENOUS at 22:23

## 2025-03-22 RX ADMIN — Medication 4 MILLIGRAM(S): at 22:23

## 2025-03-22 RX ADMIN — Medication 650 MILLIGRAM(S): at 22:23

## 2025-03-23 VITALS
HEART RATE: 82 BPM | DIASTOLIC BLOOD PRESSURE: 74 MMHG | SYSTOLIC BLOOD PRESSURE: 105 MMHG | TEMPERATURE: 98 F | OXYGEN SATURATION: 99 % | RESPIRATION RATE: 18 BRPM

## 2025-03-23 LAB
APPEARANCE UR: CLEAR — SIGNIFICANT CHANGE UP
BILIRUB UR-MCNC: NEGATIVE — SIGNIFICANT CHANGE UP
COLOR SPEC: YELLOW — SIGNIFICANT CHANGE UP
DIFF PNL FLD: NEGATIVE — SIGNIFICANT CHANGE UP
FLUAV AG NPH QL: SIGNIFICANT CHANGE UP
FLUBV AG NPH QL: SIGNIFICANT CHANGE UP
GLUCOSE UR QL: NEGATIVE MG/DL — SIGNIFICANT CHANGE UP
KETONES UR-MCNC: NEGATIVE MG/DL — SIGNIFICANT CHANGE UP
LEUKOCYTE ESTERASE UR-ACNC: NEGATIVE — SIGNIFICANT CHANGE UP
NITRITE UR-MCNC: NEGATIVE — SIGNIFICANT CHANGE UP
PH UR: 6.5 — SIGNIFICANT CHANGE UP (ref 5–8)
PROT UR-MCNC: NEGATIVE MG/DL — SIGNIFICANT CHANGE UP
RSV RNA NPH QL NAA+NON-PROBE: SIGNIFICANT CHANGE UP
SARS-COV-2 RNA SPEC QL NAA+PROBE: SIGNIFICANT CHANGE UP
SOURCE RESPIRATORY: SIGNIFICANT CHANGE UP
SP GR SPEC: 1.01 — SIGNIFICANT CHANGE UP (ref 1–1.03)
UROBILINOGEN FLD QL: 0.2 MG/DL — SIGNIFICANT CHANGE UP (ref 0.2–1)

## 2025-03-23 PROCEDURE — 76830 TRANSVAGINAL US NON-OB: CPT | Mod: 26

## 2025-04-25 ENCOUNTER — OUTPATIENT (OUTPATIENT)
Dept: OUTPATIENT SERVICES | Facility: HOSPITAL | Age: 25
LOS: 1 days | End: 2025-04-25
Payer: COMMERCIAL

## 2025-04-25 ENCOUNTER — APPOINTMENT (OUTPATIENT)
Dept: OBGYN | Facility: CLINIC | Age: 25
End: 2025-04-25

## 2025-04-25 ENCOUNTER — NON-APPOINTMENT (OUTPATIENT)
Age: 25
End: 2025-04-25

## 2025-04-25 VITALS
BODY MASS INDEX: 33.33 KG/M2 | WEIGHT: 212.38 LBS | HEIGHT: 67 IN | SYSTOLIC BLOOD PRESSURE: 118 MMHG | DIASTOLIC BLOOD PRESSURE: 75 MMHG

## 2025-04-25 DIAGNOSIS — Z00.00 ENCOUNTER FOR GENERAL ADULT MEDICAL EXAMINATION WITHOUT ABNORMAL FINDINGS: ICD-10-CM

## 2025-04-25 DIAGNOSIS — O36.80X0 PREGNANCY WITH INCONCLUSIVE FETAL VIABILITY, NOT APPLICABLE OR UNSPECIFIED: ICD-10-CM

## 2025-04-25 PROCEDURE — 76857 US EXAM PELVIC LIMITED: CPT | Mod: 26

## 2025-04-25 PROCEDURE — 99214 OFFICE O/P EST MOD 30 MIN: CPT | Mod: 25

## 2025-04-25 PROCEDURE — 76816 OB US FOLLOW-UP PER FETUS: CPT | Mod: 26

## 2025-04-25 PROCEDURE — 99459 PELVIC EXAMINATION: CPT

## 2025-04-25 RX ORDER — PYRIDOXINE HCL (VITAMIN B6) 25 MG
25 TABLET ORAL
Qty: 90 | Refills: 3 | Status: ACTIVE | COMMUNITY
Start: 2025-04-25 | End: 1900-01-01

## 2025-04-25 RX ORDER — DOCUSATE SODIUM 100 MG/1
100 CAPSULE ORAL TWICE DAILY
Qty: 60 | Refills: 5 | Status: ACTIVE | COMMUNITY
Start: 2025-04-25 | End: 1900-01-01

## 2025-04-25 RX ORDER — METOCLOPRAMIDE 5 MG/1
5 TABLET ORAL 4 TIMES DAILY
Qty: 120 | Refills: 5 | Status: ACTIVE | COMMUNITY
Start: 2025-04-25 | End: 1900-01-01

## 2025-04-28 DIAGNOSIS — O36.80X0 PREGNANCY WITH INCONCLUSIVE FETAL VIABILITY, NOT APPLICABLE OR UNSPECIFIED: ICD-10-CM

## 2025-05-07 ENCOUNTER — OUTPATIENT (OUTPATIENT)
Dept: OUTPATIENT SERVICES | Facility: HOSPITAL | Age: 25
LOS: 1 days | End: 2025-05-07
Payer: COMMERCIAL

## 2025-05-07 DIAGNOSIS — O36.80X0 PREGNANCY WITH INCONCLUSIVE FETAL VIABILITY, NOT APPLICABLE OR UNSPECIFIED: ICD-10-CM

## 2025-05-07 PROCEDURE — 86900 BLOOD TYPING SEROLOGIC ABO: CPT

## 2025-05-07 PROCEDURE — 87389 HIV-1 AG W/HIV-1&-2 AB AG IA: CPT

## 2025-05-07 PROCEDURE — 81420 FETAL CHRMOML ANEUPLOIDY: CPT

## 2025-05-07 PROCEDURE — 83036 HEMOGLOBIN GLYCOSYLATED A1C: CPT

## 2025-05-07 PROCEDURE — 83020 HEMOGLOBIN ELECTROPHORESIS: CPT

## 2025-05-07 PROCEDURE — 86803 HEPATITIS C AB TEST: CPT

## 2025-05-07 PROCEDURE — 86787 VARICELLA-ZOSTER ANTIBODY: CPT

## 2025-05-07 PROCEDURE — 83020 HEMOGLOBIN ELECTROPHORESIS: CPT | Mod: 26

## 2025-05-07 PROCEDURE — 81422 FETAL CHRMOML MICRODELTJ: CPT

## 2025-05-07 PROCEDURE — 86780 TREPONEMA PALLIDUM: CPT

## 2025-05-07 PROCEDURE — 86735 MUMPS ANTIBODY: CPT

## 2025-05-07 PROCEDURE — 87340 HEPATITIS B SURFACE AG IA: CPT

## 2025-05-07 PROCEDURE — 85025 COMPLETE CBC W/AUTO DIFF WBC: CPT

## 2025-05-07 PROCEDURE — 81222 CFTR GENE DUP/DELET VARIANTS: CPT

## 2025-05-07 PROCEDURE — 81220 CFTR GENE COM VARIANTS: CPT

## 2025-05-07 PROCEDURE — 86762 RUBELLA ANTIBODY: CPT

## 2025-05-07 PROCEDURE — 86901 BLOOD TYPING SEROLOGIC RH(D): CPT

## 2025-05-07 PROCEDURE — 81329 SMN1 GENE DOS/DELETION ALYS: CPT

## 2025-05-07 PROCEDURE — 86850 RBC ANTIBODY SCREEN: CPT

## 2025-05-07 PROCEDURE — 83655 ASSAY OF LEAD: CPT

## 2025-05-07 PROCEDURE — 81257 HBA1/HBA2 GENE: CPT

## 2025-05-07 PROCEDURE — 87086 URINE CULTURE/COLONY COUNT: CPT

## 2025-05-07 PROCEDURE — 86765 RUBEOLA ANTIBODY: CPT

## 2025-05-07 PROCEDURE — 81443 GENETIC TSTG SEVERE INH COND: CPT

## 2025-05-07 PROCEDURE — 81243 FMR1 GEN ALY DETC ABNL ALLEL: CPT

## 2025-05-08 ENCOUNTER — OUTPATIENT (OUTPATIENT)
Dept: OUTPATIENT SERVICES | Facility: HOSPITAL | Age: 25
LOS: 1 days | End: 2025-05-08

## 2025-05-08 ENCOUNTER — APPOINTMENT (OUTPATIENT)
Dept: OBGYN | Facility: CLINIC | Age: 25
End: 2025-05-08
Payer: COMMERCIAL

## 2025-05-08 ENCOUNTER — NON-APPOINTMENT (OUTPATIENT)
Age: 25
End: 2025-05-08

## 2025-05-08 ENCOUNTER — OUTPATIENT (OUTPATIENT)
Dept: OUTPATIENT SERVICES | Facility: HOSPITAL | Age: 25
LOS: 1 days | End: 2025-05-08
Payer: COMMERCIAL

## 2025-05-08 VITALS — DIASTOLIC BLOOD PRESSURE: 72 MMHG | WEIGHT: 208 LBS | SYSTOLIC BLOOD PRESSURE: 116 MMHG

## 2025-05-08 DIAGNOSIS — O36.80X0 PREGNANCY WITH INCONCLUSIVE FETAL VIABILITY, NOT APPLICABLE OR UNSPECIFIED: ICD-10-CM

## 2025-05-08 DIAGNOSIS — Z34.90 ENCOUNTER FOR SUPERVISION OF NORMAL PREGNANCY, UNSPECIFIED, UNSPECIFIED TRIMESTER: ICD-10-CM

## 2025-05-08 PROCEDURE — 81513 NFCT DS BV RNA VAG FLU ALG: CPT

## 2025-05-08 PROCEDURE — 88142 CYTOPATH C/V THIN LAYER: CPT

## 2025-05-08 PROCEDURE — 81002 URINALYSIS NONAUTO W/O SCOPE: CPT

## 2025-05-08 PROCEDURE — 87481 CANDIDA DNA AMP PROBE: CPT

## 2025-05-08 PROCEDURE — 99213 OFFICE O/P EST LOW 20 MIN: CPT

## 2025-05-08 PROCEDURE — 87491 CHLMYD TRACH DNA AMP PROBE: CPT

## 2025-05-08 PROCEDURE — 87661 TRICHOMONAS VAGINALIS AMPLIF: CPT

## 2025-05-08 PROCEDURE — 87591 N.GONORRHOEAE DNA AMP PROB: CPT

## 2025-05-08 RX ORDER — DOXYLAMINE SUCCINATE AND PYRIDOXINE HYDROCHLORIDE 10; 10 MG/1; MG/1
10-10 TABLET, DELAYED RELEASE ORAL
Qty: 120 | Refills: 2 | Status: ACTIVE | COMMUNITY
Start: 2025-05-08 | End: 1900-01-01

## 2025-05-09 DIAGNOSIS — Z34.90 ENCOUNTER FOR SUPERVISION OF NORMAL PREGNANCY, UNSPECIFIED, UNSPECIFIED TRIMESTER: ICD-10-CM

## 2025-05-12 ENCOUNTER — APPOINTMENT (OUTPATIENT)
Dept: OBGYN | Facility: CLINIC | Age: 25
End: 2025-05-12

## 2025-05-12 ENCOUNTER — NON-APPOINTMENT (OUTPATIENT)
Age: 25
End: 2025-05-12

## 2025-05-13 DIAGNOSIS — Z34.90 ENCOUNTER FOR SUPERVISION OF NORMAL PREGNANCY, UNSPECIFIED, UNSPECIFIED TRIMESTER: ICD-10-CM

## 2025-05-14 ENCOUNTER — OUTPATIENT (OUTPATIENT)
Dept: OUTPATIENT SERVICES | Facility: HOSPITAL | Age: 25
LOS: 1 days | End: 2025-05-14
Payer: COMMERCIAL

## 2025-05-14 ENCOUNTER — APPOINTMENT (OUTPATIENT)
Dept: ANTEPARTUM | Facility: CLINIC | Age: 25
End: 2025-05-14
Payer: COMMERCIAL

## 2025-05-14 ENCOUNTER — ASOB RESULT (OUTPATIENT)
Age: 25
End: 2025-05-14

## 2025-05-14 ENCOUNTER — TRANSCRIPTION ENCOUNTER (OUTPATIENT)
Age: 25
End: 2025-05-14

## 2025-05-14 DIAGNOSIS — Z34.90 ENCOUNTER FOR SUPERVISION OF NORMAL PREGNANCY, UNSPECIFIED, UNSPECIFIED TRIMESTER: ICD-10-CM

## 2025-05-14 PROCEDURE — 76813 OB US NUCHAL MEAS 1 GEST: CPT | Mod: 26

## 2025-05-14 PROCEDURE — 76813 OB US NUCHAL MEAS 1 GEST: CPT

## 2025-05-14 PROCEDURE — 76801 OB US < 14 WKS SINGLE FETUS: CPT | Mod: 26

## 2025-05-14 PROCEDURE — 76801 OB US < 14 WKS SINGLE FETUS: CPT

## 2025-05-16 DIAGNOSIS — Z3A.14 14 WEEKS GESTATION OF PREGNANCY: ICD-10-CM

## 2025-05-16 DIAGNOSIS — O99.211 OBESITY COMPLICATING PREGNANCY, FIRST TRIMESTER: ICD-10-CM

## 2025-05-16 DIAGNOSIS — Z36.89 ENCOUNTER FOR OTHER SPECIFIED ANTENATAL SCREENING: ICD-10-CM

## 2025-05-16 DIAGNOSIS — Z36.82 ENCOUNTER FOR ANTENATAL SCREENING FOR NUCHAL TRANSLUCENCY: ICD-10-CM

## 2025-05-17 DIAGNOSIS — Z34.90 ENCOUNTER FOR SUPERVISION OF NORMAL PREGNANCY, UNSPECIFIED, UNSPECIFIED TRIMESTER: ICD-10-CM

## 2025-05-17 LAB
BV BACTERIA RRNA VAG QL NAA+PROBE: NOT DETECTED
C GLABRATA RNA VAG QL NAA+PROBE: NOT DETECTED
C TRACH RRNA SPEC QL NAA+PROBE: NOT DETECTED
CANDIDA RRNA VAG QL PROBE: DETECTED
N GONORRHOEA RRNA SPEC QL NAA+PROBE: NOT DETECTED
T VAGINALIS RRNA SPEC QL NAA+PROBE: NOT DETECTED

## 2025-05-17 RX ORDER — TERCONAZOLE 8 MG/G
0.8 CREAM VAGINAL
Qty: 1 | Refills: 1 | Status: ACTIVE | COMMUNITY
Start: 2025-05-17 | End: 1900-01-01

## 2025-05-19 ENCOUNTER — NON-APPOINTMENT (OUTPATIENT)
Age: 25
End: 2025-05-19

## 2025-06-01 ENCOUNTER — OUTPATIENT (OUTPATIENT)
Dept: INPATIENT UNIT | Facility: HOSPITAL | Age: 25
LOS: 1 days | Discharge: HOME | End: 2025-06-01
Payer: COMMERCIAL

## 2025-06-01 ENCOUNTER — EMERGENCY (EMERGENCY)
Facility: HOSPITAL | Age: 25
LOS: 0 days | Discharge: LEFT BEFORE DISCHARGE | End: 2025-06-01
Payer: COMMERCIAL

## 2025-06-01 VITALS
RESPIRATION RATE: 20 BRPM | WEIGHT: 210.98 LBS | SYSTOLIC BLOOD PRESSURE: 116 MMHG | HEART RATE: 84 BPM | TEMPERATURE: 98 F | DIASTOLIC BLOOD PRESSURE: 77 MMHG | OXYGEN SATURATION: 100 %

## 2025-06-01 VITALS
RESPIRATION RATE: 14 BRPM | TEMPERATURE: 99 F | DIASTOLIC BLOOD PRESSURE: 72 MMHG | HEART RATE: 90 BPM | SYSTOLIC BLOOD PRESSURE: 117 MMHG

## 2025-06-01 VITALS — HEART RATE: 58 BPM | DIASTOLIC BLOOD PRESSURE: 62 MMHG | SYSTOLIC BLOOD PRESSURE: 111 MMHG

## 2025-06-01 DIAGNOSIS — R10.30 LOWER ABDOMINAL PAIN, UNSPECIFIED: ICD-10-CM

## 2025-06-01 DIAGNOSIS — Z3A.16 16 WEEKS GESTATION OF PREGNANCY: ICD-10-CM

## 2025-06-01 DIAGNOSIS — O21.9 VOMITING OF PREGNANCY, UNSPECIFIED: ICD-10-CM

## 2025-06-01 DIAGNOSIS — O99.891 OTHER SPECIFIED DISEASES AND CONDITIONS COMPLICATING PREGNANCY: ICD-10-CM

## 2025-06-01 DIAGNOSIS — O09.292 SUPERVISION OF PREGNANCY WITH OTHER POOR REPRODUCTIVE OR OBSTETRIC HISTORY, SECOND TRIMESTER: ICD-10-CM

## 2025-06-01 DIAGNOSIS — Z98.890 OTHER SPECIFIED POSTPROCEDURAL STATES: Chronic | ICD-10-CM

## 2025-06-01 DIAGNOSIS — Z53.21 PROCEDURE AND TREATMENT NOT CARRIED OUT DUE TO PATIENT LEAVING PRIOR TO BEING SEEN BY HEALTH CARE PROVIDER: ICD-10-CM

## 2025-06-01 DIAGNOSIS — O26.892 OTHER SPECIFIED PREGNANCY RELATED CONDITIONS, SECOND TRIMESTER: ICD-10-CM

## 2025-06-01 DIAGNOSIS — O26.899 OTHER SPECIFIED PREGNANCY RELATED CONDITIONS, UNSPECIFIED TRIMESTER: ICD-10-CM

## 2025-06-01 DIAGNOSIS — O09.12 SUPERVISION OF PREGNANCY WITH HISTORY OF ECTOPIC PREGNANCY, SECOND TRIMESTER: ICD-10-CM

## 2025-06-01 LAB
ALBUMIN SERPL ELPH-MCNC: 4.2 G/DL — SIGNIFICANT CHANGE UP (ref 3.5–5.2)
ALP SERPL-CCNC: 95 U/L — SIGNIFICANT CHANGE UP (ref 30–115)
ALT FLD-CCNC: 29 U/L — SIGNIFICANT CHANGE UP (ref 0–41)
AMYLASE P1 CFR SERPL: 149 U/L — HIGH (ref 25–115)
ANION GAP SERPL CALC-SCNC: 12 MMOL/L — SIGNIFICANT CHANGE UP (ref 7–14)
APPEARANCE UR: CLEAR — SIGNIFICANT CHANGE UP
AST SERPL-CCNC: 23 U/L — SIGNIFICANT CHANGE UP (ref 0–41)
BASOPHILS # BLD AUTO: 0.01 K/UL — SIGNIFICANT CHANGE UP (ref 0–0.2)
BASOPHILS NFR BLD AUTO: 0.1 % — SIGNIFICANT CHANGE UP (ref 0–1)
BILIRUB SERPL-MCNC: 0.2 MG/DL — SIGNIFICANT CHANGE UP (ref 0.2–1.2)
BILIRUB UR-MCNC: NEGATIVE — SIGNIFICANT CHANGE UP
BUN SERPL-MCNC: 8 MG/DL — LOW (ref 10–20)
CALCIUM SERPL-MCNC: 9.8 MG/DL — SIGNIFICANT CHANGE UP (ref 8.4–10.5)
CHLORIDE SERPL-SCNC: 104 MMOL/L — SIGNIFICANT CHANGE UP (ref 98–110)
CO2 SERPL-SCNC: 23 MMOL/L — SIGNIFICANT CHANGE UP (ref 17–32)
COLOR SPEC: YELLOW — SIGNIFICANT CHANGE UP
CREAT SERPL-MCNC: 0.9 MG/DL — SIGNIFICANT CHANGE UP (ref 0.7–1.5)
DIFF PNL FLD: NEGATIVE — SIGNIFICANT CHANGE UP
EGFR: 92 ML/MIN/1.73M2 — SIGNIFICANT CHANGE UP
EGFR: 92 ML/MIN/1.73M2 — SIGNIFICANT CHANGE UP
EOSINOPHIL # BLD AUTO: 0.17 K/UL — SIGNIFICANT CHANGE UP (ref 0–0.7)
EOSINOPHIL NFR BLD AUTO: 2.4 % — SIGNIFICANT CHANGE UP (ref 0–8)
GLUCOSE SERPL-MCNC: 65 MG/DL — LOW (ref 70–99)
GLUCOSE UR QL: NEGATIVE MG/DL — SIGNIFICANT CHANGE UP
HCT VFR BLD CALC: 38 % — SIGNIFICANT CHANGE UP (ref 37–47)
HGB BLD-MCNC: 12.9 G/DL — SIGNIFICANT CHANGE UP (ref 12–16)
IMM GRANULOCYTES NFR BLD AUTO: 0.3 % — SIGNIFICANT CHANGE UP (ref 0.1–0.3)
KETONES UR QL: NEGATIVE MG/DL — SIGNIFICANT CHANGE UP
LEUKOCYTE ESTERASE UR-ACNC: NEGATIVE — SIGNIFICANT CHANGE UP
LIDOCAIN IGE QN: 30 U/L — SIGNIFICANT CHANGE UP (ref 7–60)
LYMPHOCYTES # BLD AUTO: 0.99 K/UL — LOW (ref 1.2–3.4)
LYMPHOCYTES # BLD AUTO: 13.8 % — LOW (ref 20.5–51.1)
MCHC RBC-ENTMCNC: 30.3 PG — SIGNIFICANT CHANGE UP (ref 27–31)
MCHC RBC-ENTMCNC: 33.9 G/DL — SIGNIFICANT CHANGE UP (ref 32–37)
MCV RBC AUTO: 89.2 FL — SIGNIFICANT CHANGE UP (ref 81–99)
MONOCYTES # BLD AUTO: 0.36 K/UL — SIGNIFICANT CHANGE UP (ref 0.1–0.6)
MONOCYTES NFR BLD AUTO: 5 % — SIGNIFICANT CHANGE UP (ref 1.7–9.3)
NEUTROPHILS # BLD AUTO: 5.64 K/UL — SIGNIFICANT CHANGE UP (ref 1.4–6.5)
NEUTROPHILS NFR BLD AUTO: 78.4 % — HIGH (ref 42.2–75.2)
NITRITE UR-MCNC: NEGATIVE — SIGNIFICANT CHANGE UP
NRBC BLD AUTO-RTO: 0 /100 WBCS — SIGNIFICANT CHANGE UP (ref 0–0)
PH UR: 6 — SIGNIFICANT CHANGE UP (ref 5–8)
PLATELET # BLD AUTO: 149 K/UL — SIGNIFICANT CHANGE UP (ref 130–400)
PMV BLD: 12.7 FL — HIGH (ref 7.4–10.4)
POTASSIUM SERPL-MCNC: 3.9 MMOL/L — SIGNIFICANT CHANGE UP (ref 3.5–5)
POTASSIUM SERPL-SCNC: 3.9 MMOL/L — SIGNIFICANT CHANGE UP (ref 3.5–5)
PROT SERPL-MCNC: 7.4 G/DL — SIGNIFICANT CHANGE UP (ref 6–8)
PROT UR-MCNC: NEGATIVE MG/DL — SIGNIFICANT CHANGE UP
RBC # BLD: 4.26 M/UL — SIGNIFICANT CHANGE UP (ref 4.2–5.4)
RBC # FLD: 13.6 % — SIGNIFICANT CHANGE UP (ref 11.5–14.5)
SODIUM SERPL-SCNC: 139 MMOL/L — SIGNIFICANT CHANGE UP (ref 135–146)
SP GR SPEC: 1.01 — SIGNIFICANT CHANGE UP (ref 1–1.03)
UROBILINOGEN FLD QL: 0.2 MG/DL — SIGNIFICANT CHANGE UP (ref 0.2–1)
WBC # BLD: 7.19 K/UL — SIGNIFICANT CHANGE UP (ref 4.8–10.8)
WBC # FLD AUTO: 7.19 K/UL — SIGNIFICANT CHANGE UP (ref 4.8–10.8)

## 2025-06-01 PROCEDURE — 87801 DETECT AGNT MULT DNA AMPLI: CPT

## 2025-06-01 PROCEDURE — 86901 BLOOD TYPING SEROLOGIC RH(D): CPT

## 2025-06-01 PROCEDURE — 81003 URINALYSIS AUTO W/O SCOPE: CPT

## 2025-06-01 PROCEDURE — 87798 DETECT AGENT NOS DNA AMP: CPT

## 2025-06-01 PROCEDURE — 96375 TX/PRO/DX INJ NEW DRUG ADDON: CPT

## 2025-06-01 PROCEDURE — 87591 N.GONORRHOEAE DNA AMP PROB: CPT

## 2025-06-01 PROCEDURE — 86850 RBC ANTIBODY SCREEN: CPT

## 2025-06-01 PROCEDURE — 76705 ECHO EXAM OF ABDOMEN: CPT | Mod: 26

## 2025-06-01 PROCEDURE — 82150 ASSAY OF AMYLASE: CPT

## 2025-06-01 PROCEDURE — 86900 BLOOD TYPING SEROLOGIC ABO: CPT

## 2025-06-01 PROCEDURE — 36415 COLL VENOUS BLD VENIPUNCTURE: CPT

## 2025-06-01 PROCEDURE — 83690 ASSAY OF LIPASE: CPT

## 2025-06-01 PROCEDURE — 59025 FETAL NON-STRESS TEST: CPT

## 2025-06-01 PROCEDURE — 76705 ECHO EXAM OF ABDOMEN: CPT

## 2025-06-01 PROCEDURE — 87491 CHLMYD TRACH DNA AMP PROBE: CPT

## 2025-06-01 PROCEDURE — 85025 COMPLETE CBC W/AUTO DIFF WBC: CPT

## 2025-06-01 PROCEDURE — 96374 THER/PROPH/DIAG INJ IV PUSH: CPT

## 2025-06-01 PROCEDURE — 99214 OFFICE O/P EST MOD 30 MIN: CPT

## 2025-06-01 PROCEDURE — 87661 TRICHOMONAS VAGINALIS AMPLIF: CPT

## 2025-06-01 PROCEDURE — L9996: CPT

## 2025-06-01 PROCEDURE — 80053 COMPREHEN METABOLIC PANEL: CPT

## 2025-06-01 RX ORDER — PROCHLORPERAZINE 25 MG
25 SUPPOSITORY, RECTAL RECTAL ONCE
Refills: 0 | Status: COMPLETED | OUTPATIENT
Start: 2025-06-01 | End: 2025-06-01

## 2025-06-01 RX ORDER — ACETAMINOPHEN 500 MG/5ML
975 LIQUID (ML) ORAL ONCE
Refills: 0 | Status: COMPLETED | OUTPATIENT
Start: 2025-06-01 | End: 2025-06-01

## 2025-06-01 RX ORDER — SODIUM CHLORIDE 9 G/1000ML
500 INJECTION, SOLUTION INTRAVENOUS ONCE
Refills: 0 | Status: COMPLETED | OUTPATIENT
Start: 2025-06-01 | End: 2025-06-01

## 2025-06-01 RX ORDER — SODIUM CHLORIDE 9 G/1000ML
1000 INJECTION, SOLUTION INTRAVENOUS
Refills: 0 | Status: DISCONTINUED | OUTPATIENT
Start: 2025-06-01 | End: 2025-06-01

## 2025-06-01 RX ORDER — METOCLOPRAMIDE HCL 10 MG
10 TABLET ORAL ONCE
Refills: 0 | Status: COMPLETED | OUTPATIENT
Start: 2025-06-01 | End: 2025-06-01

## 2025-06-01 RX ORDER — ONDANSETRON HCL/PF 4 MG/2 ML
4 VIAL (ML) INJECTION ONCE
Refills: 0 | Status: COMPLETED | OUTPATIENT
Start: 2025-06-01 | End: 2025-06-01

## 2025-06-01 RX ADMIN — SODIUM CHLORIDE 125 MILLILITER(S): 9 INJECTION, SOLUTION INTRAVENOUS at 22:17

## 2025-06-01 RX ADMIN — Medication 975 MILLIGRAM(S): at 18:09

## 2025-06-01 RX ADMIN — SODIUM CHLORIDE 1000 MILLILITER(S): 9 INJECTION, SOLUTION INTRAVENOUS at 17:40

## 2025-06-01 RX ADMIN — Medication 25 MILLIGRAM(S): at 22:15

## 2025-06-01 RX ADMIN — Medication 4 MILLIGRAM(S): at 18:10

## 2025-06-01 RX ADMIN — Medication 10 MILLIGRAM(S): at 20:30

## 2025-06-01 NOTE — OB PROVIDER TRIAGE NOTE - NS_OBGYNHISTORY_OBGYN_ALL_OB_FT
ObHx: FT  x1, 7-12   SAB x1 w/ D&C   iTOP w/ D&C   h/o ectopic in  treated with methotrexate     GynHx: h/o ovarian cysts and h/o chlamydia and ghonnorhea last year. She denies any h/o fibroids or abnormal pap smears

## 2025-06-01 NOTE — OB RN TRIAGE NOTE - FALL HARM RISK - UNIVERSAL INTERVENTIONS
Bed in lowest position, wheels locked, appropriate side rails in place/Call bell, personal items and telephone in reach/Instruct patient to call for assistance before getting out of bed or chair/Non-slip footwear when patient is out of bed/[place] to call system/Physically safe environment - no spills, clutter or unnecessary equipment/Purposeful Proactive Rounding/Room/bathroom lighting operational, light cord in reach

## 2025-06-01 NOTE — OB PROVIDER TRIAGE NOTE - NSOBPROVIDERNOTE_OBGYN_ALL_OB_FT
25yo  @16w4d presenting with abdominal pain, not contraction, nausea and vomiting, reassuring maternal and fetal status   - IVF hydration  - f/u CBC, CMP, amylase, lipase, UA   - f/u vaginitis   - Zofran and Tylenol ordered for nausea and pain   - pain likely 2/2 round ligament pain with precautions provided   - f/u with PMD on Thursday 23yo  @16w4d presenting with abdominal pain, not contraction, nausea and vomiting, reassuring maternal and fetal status   - IVF hydration  - f/u CBC, CMP, amylase, lipase, UA   - f/u vaginitis   - Zofran and Tylenol ordered for nausea and pain   - pain likely 2/2 round ligament pain with precautions provided   - f/u with PMD on Thursday  att note agree with plan 23yo  @16w4d presenting with abdominal pain, not contraction, nausea and vomiting, reassuring maternal and fetal status   - IVF hydration  - f/u CBC, CMP, amylase, lipase, UA   - f/u vaginitis   - Zofran and Tylenol ordered for nausea and pain   - pain likely 2/2 round ligament pain with precautions provided   - f/u with PMD on Thursday  att note agree with plan    ADDENDUM  Pt seen at bedside for reevaluation. She reports she continue to have some abdominal and nausea, however it is slightly improved. She vomited x1 while in triage. Labs reviewed and wnl except slightly elevated amylase. RUQ sono completed and normal. Pt also endorses 15lb weight loss since beginning of the pregnancy. She is able to tolerate PO and keep some types of food down. Suspect some level of hyperemesis causing MSK pain. Offered her admission for further management and optimzation, however she declines citing childcare issues.   Recommend trial of rectal compazine, zofran and omeprazole. First dose of compazine given in traige. Meds send to pharmacy.     Pt has f/u on thursday at [facility]. Return precautions discussed

## 2025-06-01 NOTE — OB PROVIDER TRIAGE NOTE - ADDITIONAL INSTRUCTIONS
Take Rectal Compazine 25mg every 12 hours.   STOP Reglan/metoclopramide.   Take Zofran if you need additional nausea medications  Continue B6  Start Omeprazol daily for GERD/acid reflux  Take tylenol and use heat packs as needed for pain.

## 2025-06-01 NOTE — OB PROVIDER TRIAGE NOTE - NSHPPHYSICALEXAM_GEN_ALL_CORE
Vital Signs Last 24 Hrs  T(C): 37.2 (01 Jun 2025 17:07), Max: 37.2 (01 Jun 2025 17:07)  T(F): 99 (01 Jun 2025 17:07), Max: 99 (01 Jun 2025 17:07)  HR: 90 (01 Jun 2025 17:10) (84 - 90)  BP: 117/72 (01 Jun 2025 17:10) (116/77 - 117/72)  RR: 14 (01 Jun 2025 17:07) (14 - 20)  SpO2: 100% (01 Jun 2025 16:42) (100% - 100%)    Gen: NAD, sitting comfortably  Abd: Gravid, soft, NT, no reboud or guarding noted   Speculum: Cervix appeared closed, white discharge noted, no bleeding noted   Smarr: none  TVUS: CL 3.38cm

## 2025-06-01 NOTE — OB PROVIDER TRIAGE NOTE - HISTORY OF PRESENT ILLNESS
25yo  @16w4d EBONY 25 by LMP c/w first trimester sono presents to L&D with complaints of abdominal pain that started yesterday. She reports the pain is periumbilically radiating down to her lower abdomen/groin area. She reports having increasing nausea and vomiting for the past few weeks with minimal improvement with Zofran, Reglan, and Doxylamine. She denies any fevers, chills, CP, HA, blurry vision, LE swelling, abnormal vaginal discharge, LOF or VB. Denies any fetal movement at this time.     Pregnancy complicated by:   1) h/o ectopic   2) SAB and iTOP w/ D&C x2   3) h/o chlamydia last year

## 2025-06-04 ENCOUNTER — NON-APPOINTMENT (OUTPATIENT)
Age: 25
End: 2025-06-04

## 2025-06-05 ENCOUNTER — OUTPATIENT (OUTPATIENT)
Dept: OUTPATIENT SERVICES | Facility: HOSPITAL | Age: 25
LOS: 1 days | End: 2025-06-05
Payer: COMMERCIAL

## 2025-06-05 ENCOUNTER — APPOINTMENT (OUTPATIENT)
Dept: OBGYN | Facility: CLINIC | Age: 25
End: 2025-06-05
Payer: COMMERCIAL

## 2025-06-05 VITALS — DIASTOLIC BLOOD PRESSURE: 68 MMHG | SYSTOLIC BLOOD PRESSURE: 112 MMHG | WEIGHT: 209 LBS

## 2025-06-05 DIAGNOSIS — Z34.90 ENCOUNTER FOR SUPERVISION OF NORMAL PREGNANCY, UNSPECIFIED, UNSPECIFIED TRIMESTER: ICD-10-CM

## 2025-06-05 DIAGNOSIS — Z98.890 OTHER SPECIFIED POSTPROCEDURAL STATES: Chronic | ICD-10-CM

## 2025-06-05 PROCEDURE — 99213 OFFICE O/P EST LOW 20 MIN: CPT

## 2025-06-05 PROCEDURE — 81002 URINALYSIS NONAUTO W/O SCOPE: CPT

## 2025-06-06 DIAGNOSIS — Z34.90 ENCOUNTER FOR SUPERVISION OF NORMAL PREGNANCY, UNSPECIFIED, UNSPECIFIED TRIMESTER: ICD-10-CM

## 2025-06-10 ENCOUNTER — APPOINTMENT (OUTPATIENT)
Dept: ANTEPARTUM | Facility: CLINIC | Age: 25
End: 2025-06-10

## 2025-06-27 ENCOUNTER — APPOINTMENT (OUTPATIENT)
Dept: ANTEPARTUM | Facility: CLINIC | Age: 25
End: 2025-06-27

## 2025-06-27 ENCOUNTER — NON-APPOINTMENT (OUTPATIENT)
Age: 25
End: 2025-06-27

## 2025-07-01 ENCOUNTER — ASOB RESULT (OUTPATIENT)
Age: 25
End: 2025-07-01

## 2025-07-01 ENCOUNTER — APPOINTMENT (OUTPATIENT)
Dept: ANTEPARTUM | Facility: CLINIC | Age: 25
End: 2025-07-01
Payer: COMMERCIAL

## 2025-07-01 ENCOUNTER — OUTPATIENT (OUTPATIENT)
Dept: OUTPATIENT SERVICES | Facility: HOSPITAL | Age: 25
LOS: 1 days | End: 2025-07-01
Payer: COMMERCIAL

## 2025-07-01 DIAGNOSIS — Z34.90 ENCOUNTER FOR SUPERVISION OF NORMAL PREGNANCY, UNSPECIFIED, UNSPECIFIED TRIMESTER: ICD-10-CM

## 2025-07-01 DIAGNOSIS — Z98.890 OTHER SPECIFIED POSTPROCEDURAL STATES: Chronic | ICD-10-CM

## 2025-07-01 PROCEDURE — 76811 OB US DETAILED SNGL FETUS: CPT | Mod: 26

## 2025-07-01 PROCEDURE — 76817 TRANSVAGINAL US OBSTETRIC: CPT | Mod: 26

## 2025-07-01 PROCEDURE — 76811 OB US DETAILED SNGL FETUS: CPT

## 2025-07-01 PROCEDURE — 82105 ALPHA-FETOPROTEIN SERUM: CPT

## 2025-07-01 PROCEDURE — 76817 TRANSVAGINAL US OBSTETRIC: CPT

## 2025-07-02 ENCOUNTER — NON-APPOINTMENT (OUTPATIENT)
Age: 25
End: 2025-07-02

## 2025-07-03 ENCOUNTER — APPOINTMENT (OUTPATIENT)
Dept: OBGYN | Facility: CLINIC | Age: 25
End: 2025-07-03
Payer: COMMERCIAL

## 2025-07-03 ENCOUNTER — OUTPATIENT (OUTPATIENT)
Dept: OUTPATIENT SERVICES | Facility: HOSPITAL | Age: 25
LOS: 1 days | End: 2025-07-03
Payer: COMMERCIAL

## 2025-07-03 VITALS
SYSTOLIC BLOOD PRESSURE: 115 MMHG | BODY MASS INDEX: 31.86 KG/M2 | HEART RATE: 93 BPM | HEIGHT: 67 IN | WEIGHT: 203 LBS | DIASTOLIC BLOOD PRESSURE: 81 MMHG

## 2025-07-03 DIAGNOSIS — Z98.890 OTHER SPECIFIED POSTPROCEDURAL STATES: Chronic | ICD-10-CM

## 2025-07-03 DIAGNOSIS — Z34.90 ENCOUNTER FOR SUPERVISION OF NORMAL PREGNANCY, UNSPECIFIED, UNSPECIFIED TRIMESTER: ICD-10-CM

## 2025-07-03 LAB
BILIRUB UR QL STRIP: NEGATIVE
CLARITY UR: CLEAR
COLLECTION METHOD: NORMAL
GLUCOSE UR-MCNC: NEGATIVE
HCG UR QL: 0.2 EU/DL
HGB UR QL STRIP.AUTO: NEGATIVE
KETONES UR-MCNC: NEGATIVE
LEUKOCYTE ESTERASE UR QL STRIP: NEGATIVE
NITRITE UR QL STRIP: NEGATIVE
PH UR STRIP: 6
PROT UR STRIP-MCNC: NEGATIVE
SP GR UR STRIP: 1.02

## 2025-07-03 PROCEDURE — 99213 OFFICE O/P EST LOW 20 MIN: CPT

## 2025-07-03 PROCEDURE — 81002 URINALYSIS NONAUTO W/O SCOPE: CPT

## 2025-07-07 DIAGNOSIS — Z00.00 ENCOUNTER FOR GENERAL ADULT MEDICAL EXAMINATION WITHOUT ABNORMAL FINDINGS: ICD-10-CM

## 2025-07-07 DIAGNOSIS — Z36.3 ENCOUNTER FOR ANTENATAL SCREENING FOR MALFORMATIONS: ICD-10-CM

## 2025-07-07 DIAGNOSIS — Z34.90 ENCOUNTER FOR SUPERVISION OF NORMAL PREGNANCY, UNSPECIFIED, UNSPECIFIED TRIMESTER: ICD-10-CM

## 2025-07-07 DIAGNOSIS — Z3A.20 20 WEEKS GESTATION OF PREGNANCY: ICD-10-CM

## 2025-07-07 DIAGNOSIS — O99.212 OBESITY COMPLICATING PREGNANCY, SECOND TRIMESTER: ICD-10-CM

## 2025-07-17 ENCOUNTER — APPOINTMENT (OUTPATIENT)
Dept: ANTEPARTUM | Facility: CLINIC | Age: 25
End: 2025-07-17

## 2025-07-22 ENCOUNTER — EMERGENCY (EMERGENCY)
Facility: HOSPITAL | Age: 25
LOS: 0 days | Discharge: LEFT BEFORE DISCHARGE | End: 2025-07-22
Payer: COMMERCIAL

## 2025-07-22 VITALS
SYSTOLIC BLOOD PRESSURE: 107 MMHG | HEART RATE: 100 BPM | TEMPERATURE: 98 F | RESPIRATION RATE: 18 BRPM | WEIGHT: 205.03 LBS | OXYGEN SATURATION: 98 % | DIASTOLIC BLOOD PRESSURE: 70 MMHG

## 2025-07-22 DIAGNOSIS — Z98.890 OTHER SPECIFIED POSTPROCEDURAL STATES: Chronic | ICD-10-CM

## 2025-07-22 DIAGNOSIS — R05.1 ACUTE COUGH: ICD-10-CM

## 2025-07-22 DIAGNOSIS — Z53.21 PROCEDURE AND TREATMENT NOT CARRIED OUT DUE TO PATIENT LEAVING PRIOR TO BEING SEEN BY HEALTH CARE PROVIDER: ICD-10-CM

## 2025-07-22 PROCEDURE — L9991: CPT
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
## 2025-08-06 ENCOUNTER — APPOINTMENT (OUTPATIENT)
Dept: OBGYN | Facility: CLINIC | Age: 25
End: 2025-08-06
Payer: COMMERCIAL

## 2025-08-06 ENCOUNTER — OUTPATIENT (OUTPATIENT)
Dept: OUTPATIENT SERVICES | Facility: HOSPITAL | Age: 25
LOS: 1 days | End: 2025-08-06
Payer: COMMERCIAL

## 2025-08-06 VITALS
HEART RATE: 98 BPM | SYSTOLIC BLOOD PRESSURE: 106 MMHG | DIASTOLIC BLOOD PRESSURE: 66 MMHG | HEIGHT: 67 IN | WEIGHT: 210 LBS | BODY MASS INDEX: 32.96 KG/M2

## 2025-08-06 DIAGNOSIS — Z34.90 ENCOUNTER FOR SUPERVISION OF NORMAL PREGNANCY, UNSPECIFIED, UNSPECIFIED TRIMESTER: ICD-10-CM

## 2025-08-06 DIAGNOSIS — Z98.890 OTHER SPECIFIED POSTPROCEDURAL STATES: Chronic | ICD-10-CM

## 2025-08-06 PROCEDURE — 81002 URINALYSIS NONAUTO W/O SCOPE: CPT

## 2025-08-06 PROCEDURE — 99213 OFFICE O/P EST LOW 20 MIN: CPT

## 2025-08-07 DIAGNOSIS — Z34.90 ENCOUNTER FOR SUPERVISION OF NORMAL PREGNANCY, UNSPECIFIED, UNSPECIFIED TRIMESTER: ICD-10-CM

## 2025-08-12 ENCOUNTER — APPOINTMENT (OUTPATIENT)
Dept: ANTEPARTUM | Facility: CLINIC | Age: 25
End: 2025-08-12
Payer: COMMERCIAL

## 2025-08-12 ENCOUNTER — OUTPATIENT (OUTPATIENT)
Dept: OUTPATIENT SERVICES | Facility: HOSPITAL | Age: 25
LOS: 1 days | End: 2025-08-12
Payer: COMMERCIAL

## 2025-08-12 ENCOUNTER — ASOB RESULT (OUTPATIENT)
Age: 25
End: 2025-08-12

## 2025-08-12 ENCOUNTER — NON-APPOINTMENT (OUTPATIENT)
Age: 25
End: 2025-08-12

## 2025-08-12 DIAGNOSIS — Z34.90 ENCOUNTER FOR SUPERVISION OF NORMAL PREGNANCY, UNSPECIFIED, UNSPECIFIED TRIMESTER: ICD-10-CM

## 2025-08-12 DIAGNOSIS — Z98.890 OTHER SPECIFIED POSTPROCEDURAL STATES: Chronic | ICD-10-CM

## 2025-08-12 LAB
BILIRUB UR QL STRIP: NEGATIVE
CLARITY UR: CLEAR
COLLECTION METHOD: NORMAL
GLUCOSE UR-MCNC: NEGATIVE
HCG UR QL: 0.2 EU/DL
HGB UR QL STRIP.AUTO: NEGATIVE
KETONES UR-MCNC: NEGATIVE
LEUKOCYTE ESTERASE UR QL STRIP: NEGATIVE
NITRITE UR QL STRIP: NEGATIVE
PH UR STRIP: 6.5
PROT UR STRIP-MCNC: NEGATIVE
SP GR UR STRIP: 1.02

## 2025-08-12 PROCEDURE — 96041 GENETIC COUNSELING SVC EA 30: CPT

## 2025-08-12 PROCEDURE — 76816 OB US FOLLOW-UP PER FETUS: CPT | Mod: 26

## 2025-08-14 DIAGNOSIS — Z14.8 GENETIC CARRIER OF OTHER DISEASE: ICD-10-CM

## 2025-08-22 ENCOUNTER — OUTPATIENT (OUTPATIENT)
Dept: OUTPATIENT SERVICES | Facility: HOSPITAL | Age: 25
LOS: 1 days | End: 2025-08-22
Payer: COMMERCIAL

## 2025-08-22 DIAGNOSIS — Z98.890 OTHER SPECIFIED POSTPROCEDURAL STATES: Chronic | ICD-10-CM

## 2025-08-22 DIAGNOSIS — Z34.90 ENCOUNTER FOR SUPERVISION OF NORMAL PREGNANCY, UNSPECIFIED, UNSPECIFIED TRIMESTER: ICD-10-CM

## 2025-08-22 PROCEDURE — 85025 COMPLETE CBC W/AUTO DIFF WBC: CPT

## 2025-08-22 PROCEDURE — 36415 COLL VENOUS BLD VENIPUNCTURE: CPT

## 2025-08-22 PROCEDURE — 82950 GLUCOSE TEST: CPT

## 2025-08-23 DIAGNOSIS — Z34.90 ENCOUNTER FOR SUPERVISION OF NORMAL PREGNANCY, UNSPECIFIED, UNSPECIFIED TRIMESTER: ICD-10-CM

## 2025-08-24 ENCOUNTER — OUTPATIENT (OUTPATIENT)
Dept: INPATIENT UNIT | Facility: HOSPITAL | Age: 25
LOS: 1 days | Discharge: HOME | End: 2025-08-24
Payer: COMMERCIAL

## 2025-08-24 VITALS — DIASTOLIC BLOOD PRESSURE: 63 MMHG | HEART RATE: 93 BPM | SYSTOLIC BLOOD PRESSURE: 116 MMHG

## 2025-08-24 DIAGNOSIS — Z98.890 OTHER SPECIFIED POSTPROCEDURAL STATES: Chronic | ICD-10-CM

## 2025-08-24 DIAGNOSIS — O26.899 OTHER SPECIFIED PREGNANCY RELATED CONDITIONS, UNSPECIFIED TRIMESTER: ICD-10-CM

## 2025-08-24 PROCEDURE — 87661 TRICHOMONAS VAGINALIS AMPLIF: CPT

## 2025-08-24 PROCEDURE — 87591 N.GONORRHOEAE DNA AMP PROB: CPT

## 2025-08-24 PROCEDURE — 81513 NFCT DS BV RNA VAG FLU ALG: CPT

## 2025-08-24 PROCEDURE — 99214 OFFICE O/P EST MOD 30 MIN: CPT

## 2025-08-24 PROCEDURE — 87491 CHLMYD TRACH DNA AMP PROBE: CPT

## 2025-08-24 PROCEDURE — 87481 CANDIDA DNA AMP PROBE: CPT

## 2025-08-24 RX ORDER — METOCLOPRAMIDE HCL 10 MG
10 TABLET ORAL ONCE
Refills: 0 | Status: COMPLETED | OUTPATIENT
Start: 2025-08-24 | End: 2025-08-24

## 2025-08-24 RX ADMIN — Medication 10 MILLIGRAM(S): at 23:31

## 2025-08-25 VITALS — HEART RATE: 72 BPM | OXYGEN SATURATION: 99 %

## 2025-08-25 RX ORDER — METOCLOPRAMIDE HCL 10 MG
1 TABLET ORAL
Qty: 28 | Refills: 0
Start: 2025-08-25 | End: 2025-08-31

## 2025-08-26 DIAGNOSIS — J45.909 UNSPECIFIED ASTHMA, UNCOMPLICATED: ICD-10-CM

## 2025-08-26 DIAGNOSIS — O09.13 SUPERVISION OF PREGNANCY WITH HISTORY OF ECTOPIC PREGNANCY, THIRD TRIMESTER: ICD-10-CM

## 2025-08-26 DIAGNOSIS — O99.513 DISEASES OF THE RESPIRATORY SYSTEM COMPLICATING PREGNANCY, THIRD TRIMESTER: ICD-10-CM

## 2025-08-26 DIAGNOSIS — R11.0 NAUSEA: ICD-10-CM

## 2025-08-26 DIAGNOSIS — B00.9 HERPESVIRAL INFECTION, UNSPECIFIED: ICD-10-CM

## 2025-08-26 DIAGNOSIS — Z3A.28 28 WEEKS GESTATION OF PREGNANCY: ICD-10-CM

## 2025-08-26 DIAGNOSIS — N83.209 UNSPECIFIED OVARIAN CYST, UNSPECIFIED SIDE: ICD-10-CM

## 2025-08-26 DIAGNOSIS — O98.513 OTHER VIRAL DISEASES COMPLICATING PREGNANCY, THIRD TRIMESTER: ICD-10-CM

## 2025-08-26 DIAGNOSIS — O26.893 OTHER SPECIFIED PREGNANCY RELATED CONDITIONS, THIRD TRIMESTER: ICD-10-CM

## 2025-08-26 DIAGNOSIS — O09.293 SUPERVISION OF PREGNANCY WITH OTHER POOR REPRODUCTIVE OR OBSTETRIC HISTORY, THIRD TRIMESTER: ICD-10-CM

## 2025-08-26 DIAGNOSIS — R06.02 SHORTNESS OF BREATH: ICD-10-CM

## 2025-08-26 DIAGNOSIS — O34.83 MATERNAL CARE FOR OTHER ABNORMALITIES OF PELVIC ORGANS, THIRD TRIMESTER: ICD-10-CM

## 2025-08-26 LAB
BV BACTERIA RRNA VAG QL NAA+PROBE: SIGNIFICANT CHANGE UP
C GLABRATA RNA VAG QL NAA+PROBE: SIGNIFICANT CHANGE UP
C TRACH RRNA SPEC QL NAA+PROBE: SIGNIFICANT CHANGE UP
CANDIDA RRNA VAG QL PROBE: SIGNIFICANT CHANGE UP
N GONORRHOEA RRNA SPEC QL NAA+PROBE: SIGNIFICANT CHANGE UP
T VAGINALIS RRNA SPEC QL NAA+PROBE: SIGNIFICANT CHANGE UP

## 2025-09-04 ENCOUNTER — OUTPATIENT (OUTPATIENT)
Dept: OUTPATIENT SERVICES | Facility: HOSPITAL | Age: 25
LOS: 1 days | End: 2025-09-04
Payer: COMMERCIAL

## 2025-09-04 ENCOUNTER — APPOINTMENT (OUTPATIENT)
Dept: OBGYN | Facility: CLINIC | Age: 25
End: 2025-09-04
Payer: COMMERCIAL

## 2025-09-04 VITALS — WEIGHT: 209 LBS | SYSTOLIC BLOOD PRESSURE: 94 MMHG | DIASTOLIC BLOOD PRESSURE: 62 MMHG

## 2025-09-04 DIAGNOSIS — Z98.890 OTHER SPECIFIED POSTPROCEDURAL STATES: Chronic | ICD-10-CM

## 2025-09-04 DIAGNOSIS — Z34.90 ENCOUNTER FOR SUPERVISION OF NORMAL PREGNANCY, UNSPECIFIED, UNSPECIFIED TRIMESTER: ICD-10-CM

## 2025-09-04 LAB
BILIRUB UR QL STRIP: NORMAL
CLARITY UR: CLEAR
COLLECTION METHOD: NORMAL
GLUCOSE UR-MCNC: NORMAL
HCG UR QL: 0.2 EU/DL
HGB UR QL STRIP.AUTO: NORMAL
KETONES UR-MCNC: NORMAL
LEUKOCYTE ESTERASE UR QL STRIP: NORMAL
NITRITE UR QL STRIP: NORMAL
PH UR STRIP: 6.5
PROT UR STRIP-MCNC: NORMAL
SP GR UR STRIP: 1.01

## 2025-09-04 PROCEDURE — 99213 OFFICE O/P EST LOW 20 MIN: CPT

## 2025-09-04 PROCEDURE — 81002 URINALYSIS NONAUTO W/O SCOPE: CPT

## 2025-09-04 RX ORDER — VALACYCLOVIR 1 G/1
1 TABLET, FILM COATED ORAL DAILY
Qty: 30 | Refills: 1 | Status: ACTIVE | COMMUNITY
Start: 2025-09-04 | End: 1900-01-01

## 2025-09-05 ENCOUNTER — NON-APPOINTMENT (OUTPATIENT)
Age: 25
End: 2025-09-05

## 2025-09-05 DIAGNOSIS — Z34.90 ENCOUNTER FOR SUPERVISION OF NORMAL PREGNANCY, UNSPECIFIED, UNSPECIFIED TRIMESTER: ICD-10-CM

## 2025-09-15 ENCOUNTER — NON-APPOINTMENT (OUTPATIENT)
Age: 25
End: 2025-09-15

## 2025-09-15 ENCOUNTER — APPOINTMENT (OUTPATIENT)
Dept: OBGYN | Facility: CLINIC | Age: 25
End: 2025-09-15
Payer: COMMERCIAL

## 2025-09-15 VITALS
DIASTOLIC BLOOD PRESSURE: 54 MMHG | BODY MASS INDEX: 32.96 KG/M2 | WEIGHT: 210 LBS | HEIGHT: 67 IN | SYSTOLIC BLOOD PRESSURE: 92 MMHG

## 2025-09-15 DIAGNOSIS — Z34.90 ENCOUNTER FOR SUPERVISION OF NORMAL PREGNANCY, UNSPECIFIED, UNSPECIFIED TRIMESTER: ICD-10-CM

## 2025-09-15 PROCEDURE — 99213 OFFICE O/P EST LOW 20 MIN: CPT

## 2025-09-16 PROBLEM — Z87.59 PERSONAL HISTORY OF OTHER COMPLICATIONS OF PREGNANCY, CHILDBIRTH AND THE PUERPERIUM: Chronic | Status: ACTIVE | Noted: 2025-08-24

## 2025-09-19 ENCOUNTER — APPOINTMENT (OUTPATIENT)
Dept: ANTEPARTUM | Facility: CLINIC | Age: 25
End: 2025-09-19
Payer: COMMERCIAL

## 2025-09-19 ENCOUNTER — ASOB RESULT (OUTPATIENT)
Age: 25
End: 2025-09-19

## 2025-09-19 PROCEDURE — 76819 FETAL BIOPHYS PROFIL W/O NST: CPT | Mod: 26,59

## 2025-09-19 PROCEDURE — 76816 OB US FOLLOW-UP PER FETUS: CPT | Mod: 26

## 2025-09-28 LAB — BACTERIA UR CULT: NORMAL
